# Patient Record
Sex: MALE | Race: OTHER
[De-identification: names, ages, dates, MRNs, and addresses within clinical notes are randomized per-mention and may not be internally consistent; named-entity substitution may affect disease eponyms.]

---

## 2018-12-26 ENCOUNTER — HOSPITAL ENCOUNTER (INPATIENT)
Dept: HOSPITAL 72 - EMR | Age: 43
LOS: 9 days | Discharge: HOME | DRG: 710 | End: 2019-01-04
Payer: MEDICAID

## 2018-12-26 VITALS — SYSTOLIC BLOOD PRESSURE: 162 MMHG | DIASTOLIC BLOOD PRESSURE: 85 MMHG

## 2018-12-26 VITALS — DIASTOLIC BLOOD PRESSURE: 67 MMHG | SYSTOLIC BLOOD PRESSURE: 153 MMHG

## 2018-12-26 VITALS — DIASTOLIC BLOOD PRESSURE: 69 MMHG | SYSTOLIC BLOOD PRESSURE: 169 MMHG

## 2018-12-26 VITALS — SYSTOLIC BLOOD PRESSURE: 158 MMHG | DIASTOLIC BLOOD PRESSURE: 83 MMHG

## 2018-12-26 VITALS — SYSTOLIC BLOOD PRESSURE: 150 MMHG | DIASTOLIC BLOOD PRESSURE: 69 MMHG

## 2018-12-26 VITALS — SYSTOLIC BLOOD PRESSURE: 155 MMHG | DIASTOLIC BLOOD PRESSURE: 79 MMHG

## 2018-12-26 VITALS — DIASTOLIC BLOOD PRESSURE: 79 MMHG | SYSTOLIC BLOOD PRESSURE: 161 MMHG

## 2018-12-26 VITALS — DIASTOLIC BLOOD PRESSURE: 75 MMHG | SYSTOLIC BLOOD PRESSURE: 152 MMHG

## 2018-12-26 VITALS — DIASTOLIC BLOOD PRESSURE: 77 MMHG | SYSTOLIC BLOOD PRESSURE: 158 MMHG

## 2018-12-26 VITALS — DIASTOLIC BLOOD PRESSURE: 77 MMHG | SYSTOLIC BLOOD PRESSURE: 148 MMHG

## 2018-12-26 VITALS — DIASTOLIC BLOOD PRESSURE: 89 MMHG | SYSTOLIC BLOOD PRESSURE: 152 MMHG

## 2018-12-26 VITALS — SYSTOLIC BLOOD PRESSURE: 163 MMHG | DIASTOLIC BLOOD PRESSURE: 105 MMHG

## 2018-12-26 VITALS — HEIGHT: 63 IN | BODY MASS INDEX: 30.3 KG/M2 | WEIGHT: 171 LBS

## 2018-12-26 DIAGNOSIS — R65.20: ICD-10-CM

## 2018-12-26 DIAGNOSIS — D64.9: ICD-10-CM

## 2018-12-26 DIAGNOSIS — E87.6: ICD-10-CM

## 2018-12-26 DIAGNOSIS — I96: ICD-10-CM

## 2018-12-26 DIAGNOSIS — I10: ICD-10-CM

## 2018-12-26 DIAGNOSIS — E87.1: ICD-10-CM

## 2018-12-26 DIAGNOSIS — M60.861: ICD-10-CM

## 2018-12-26 DIAGNOSIS — A41.02: Primary | ICD-10-CM

## 2018-12-26 DIAGNOSIS — E11.65: ICD-10-CM

## 2018-12-26 DIAGNOSIS — L03.115: ICD-10-CM

## 2018-12-26 LAB
ADD MANUAL DIFF: YES
ALBUMIN SERPL-MCNC: 2.5 G/DL (ref 3.4–5)
ALP SERPL-CCNC: 172 U/L (ref 46–116)
ALT SERPL-CCNC: 27 U/L (ref 12–78)
ANION GAP SERPL CALC-SCNC: 19 MMOL/L (ref 5–15)
APPEARANCE UR: CLEAR
APTT BLD: 46 SEC (ref 23–33)
APTT PPP: (no result) S
AST SERPL-CCNC: 15 U/L (ref 15–37)
BILIRUB SERPL-MCNC: 0.7 MG/DL (ref 0.2–1)
BUN SERPL-MCNC: 19 MG/DL (ref 7–18)
CALCIUM SERPL-MCNC: 9.3 MG/DL (ref 8.5–10.1)
CHLORIDE SERPL-SCNC: 92 MMOL/L (ref 98–107)
CK MB SERPL-MCNC: < 0.5 NG/ML (ref 0–3.6)
CK SERPL-CCNC: 46 U/L (ref 26–308)
CO2 SERPL-SCNC: 19 MMOL/L (ref 21–32)
CREAT SERPL-MCNC: 0.9 MG/DL (ref 0.55–1.3)
ERYTHROCYTE [DISTWIDTH] IN BLOOD BY AUTOMATED COUNT: 11.5 % (ref 11.6–14.8)
GLOBULIN SER-MCNC: 5.3 G/DL
GLUCOSE UR STRIP-MCNC: (no result) MG/DL
HCT VFR BLD CALC: 44.7 % (ref 42–52)
HGB BLD-MCNC: 14.8 G/DL (ref 14.2–18)
INR PPP: 1.1 (ref 0.9–1.1)
KETONES UR QL STRIP: (no result)
LEUKOCYTE ESTERASE UR QL STRIP: (no result)
MCV RBC AUTO: 92 FL (ref 80–99)
NITRITE UR QL STRIP: NEGATIVE
PH UR STRIP: 5 [PH] (ref 4.5–8)
PLATELET # BLD: 206 K/UL (ref 150–450)
POTASSIUM SERPL-SCNC: 4.2 MMOL/L (ref 3.5–5.1)
PROT UR QL STRIP: (no result)
RBC # BLD AUTO: 4.83 M/UL (ref 4.7–6.1)
SODIUM SERPL-SCNC: 130 MMOL/L (ref 136–145)
SP GR UR STRIP: 1.02 (ref 1–1.03)
UROBILINOGEN UR-MCNC: NORMAL MG/DL (ref 0–1)
WBC # BLD AUTO: 25.5 K/UL (ref 4.8–10.8)

## 2018-12-26 PROCEDURE — 0KBS0ZX EXCISION OF RIGHT LOWER LEG MUSCLE, OPEN APPROACH, DIAGNOSTIC: ICD-10-PCS

## 2018-12-26 PROCEDURE — 83036 HEMOGLOBIN GLYCOSYLATED A1C: CPT

## 2018-12-26 PROCEDURE — 85610 PROTHROMBIN TIME: CPT

## 2018-12-26 PROCEDURE — 87070 CULTURE OTHR SPECIMN AEROBIC: CPT

## 2018-12-26 PROCEDURE — 80202 ASSAY OF VANCOMYCIN: CPT

## 2018-12-26 PROCEDURE — 94150 VITAL CAPACITY TEST: CPT

## 2018-12-26 PROCEDURE — 80048 BASIC METABOLIC PNL TOTAL CA: CPT

## 2018-12-26 PROCEDURE — 82550 ASSAY OF CK (CPK): CPT

## 2018-12-26 PROCEDURE — 83615 LACTATE (LD) (LDH) ENZYME: CPT

## 2018-12-26 PROCEDURE — 93005 ELECTROCARDIOGRAM TRACING: CPT

## 2018-12-26 PROCEDURE — 86850 RBC ANTIBODY SCREEN: CPT

## 2018-12-26 PROCEDURE — 83550 IRON BINDING TEST: CPT

## 2018-12-26 PROCEDURE — 0K9S0ZZ DRAINAGE OF RIGHT LOWER LEG MUSCLE, OPEN APPROACH: ICD-10-PCS

## 2018-12-26 PROCEDURE — 80053 COMPREHEN METABOLIC PANEL: CPT

## 2018-12-26 PROCEDURE — 85007 BL SMEAR W/DIFF WBC COUNT: CPT

## 2018-12-26 PROCEDURE — 87040 BLOOD CULTURE FOR BACTERIA: CPT

## 2018-12-26 PROCEDURE — 36415 COLL VENOUS BLD VENIPUNCTURE: CPT

## 2018-12-26 PROCEDURE — 87181 SC STD AGAR DILUTION PER AGT: CPT

## 2018-12-26 PROCEDURE — 87205 SMEAR GRAM STAIN: CPT

## 2018-12-26 PROCEDURE — 87075 CULTR BACTERIA EXCEPT BLOOD: CPT

## 2018-12-26 PROCEDURE — 85730 THROMBOPLASTIN TIME PARTIAL: CPT

## 2018-12-26 PROCEDURE — 85060 BLOOD SMEAR INTERPRETATION: CPT

## 2018-12-26 PROCEDURE — 82270 OCCULT BLOOD FECES: CPT

## 2018-12-26 PROCEDURE — 82728 ASSAY OF FERRITIN: CPT

## 2018-12-26 PROCEDURE — 81003 URINALYSIS AUTO W/O SCOPE: CPT

## 2018-12-26 PROCEDURE — 85044 MANUAL RETICULOCYTE COUNT: CPT

## 2018-12-26 PROCEDURE — 0KDS0ZZ EXTRACTION OF RIGHT LOWER LEG MUSCLE, OPEN APPROACH: ICD-10-PCS

## 2018-12-26 PROCEDURE — 71045 X-RAY EXAM CHEST 1 VIEW: CPT

## 2018-12-26 PROCEDURE — 86900 BLOOD TYPING SEROLOGIC ABO: CPT

## 2018-12-26 PROCEDURE — 82746 ASSAY OF FOLIC ACID SERUM: CPT

## 2018-12-26 PROCEDURE — 83605 ASSAY OF LACTIC ACID: CPT

## 2018-12-26 PROCEDURE — 85384 FIBRINOGEN ACTIVITY: CPT

## 2018-12-26 PROCEDURE — 82553 CREATINE MB FRACTION: CPT

## 2018-12-26 PROCEDURE — 99291 CRITICAL CARE FIRST HOUR: CPT

## 2018-12-26 PROCEDURE — 86901 BLOOD TYPING SEROLOGIC RH(D): CPT

## 2018-12-26 PROCEDURE — 96365 THER/PROPH/DIAG IV INF INIT: CPT

## 2018-12-26 PROCEDURE — 83540 ASSAY OF IRON: CPT

## 2018-12-26 PROCEDURE — 85660 RBC SICKLE CELL TEST: CPT

## 2018-12-26 PROCEDURE — 85025 COMPLETE CBC W/AUTO DIFF WBC: CPT

## 2018-12-26 PROCEDURE — 82962 GLUCOSE BLOOD TEST: CPT

## 2018-12-26 PROCEDURE — 84484 ASSAY OF TROPONIN QUANT: CPT

## 2018-12-26 PROCEDURE — 83735 ASSAY OF MAGNESIUM: CPT

## 2018-12-26 PROCEDURE — 94003 VENT MGMT INPAT SUBQ DAY: CPT

## 2018-12-26 RX ADMIN — INSULIN ASPART SCH UNITS: 100 INJECTION, SOLUTION INTRAVENOUS; SUBCUTANEOUS at 23:06

## 2018-12-26 RX ADMIN — HEPARIN SODIUM SCH UNITS: 5000 INJECTION INTRAVENOUS; SUBCUTANEOUS at 22:00

## 2018-12-26 RX ADMIN — DEXTROSE MONOHYDRATE SCH MLS/HR: 50 INJECTION, SOLUTION INTRAVENOUS at 20:00

## 2018-12-26 RX ADMIN — DOCUSATE SODIUM SCH MG: 100 CAPSULE, LIQUID FILLED ORAL at 22:55

## 2018-12-26 NOTE — NUR
NURSE NOTES:PLACED A TELEPHONE CALL TO DR JACKSON AND MADE AWARE AND NOTIFIED REGARDING PT 
WITH FEVER 102 ORALLY.M.D STATING TO GIVE [id number] PO.PT MEDICATED AS PER M.D 
ORDERS.WILL CONT TO MONITOR.

## 2018-12-26 NOTE — NUR
ED Nurse Note:



spoke with HARDIK Vance from 2E regarding giving report. Per HARDIK Vance, receiving RN 
is scheduled to leave, will call back for further info.

## 2018-12-26 NOTE — PRE-PROCEDURE NOTE/ATTESTATION
Pre-Procedure Note/Attestation


Complete Prior to Procedure


Planned Procedure:  right


Procedure Narrative:


incision and drainage with possible debridement, open wound, drain or wound VAC 

placement of right leg





Indications for Procedure


Pre-Operative Diagnosis:


right leg cellulitis with abscess possible necrotizing infection





Attestation


I attest that I discussed the nature of the procedure; its benefits; risks and 

complications; and alternatives (and the risks and benefits of such alternatives

), prior to the procedure, with the patient (or the patient's legal 

representative).





I attest that, if there was a reasonable possibility of needing a blood 

transfusion, the patient (or the patient's legal representative) was given the 

[place] Department [employer] Services standardized written summary, pursuant 

to the [place] Blood Safety Act ([place] Health and Safety Code # 1645, as 

amended).





I attest that I re-evaluated the patient just prior to the surgery and that 

there has been no change in the patient's H&P, except as documented below:











Tony Fox Dec 26, 2018 19:19

## 2018-12-26 NOTE — IMMEDIATE POST-OP EVALUATION
Immediate Post-Op Evalulation


Immediate Post-Op Evalulation


Procedure:  R Leg I and D


Date of Evaluation:  Dec 26, 2018


Blood Products:  0


Estimated Blood Loss:  50


Urinary Output:  0


Pain Score (1-10):  2


Nausea:  No


Vomiting:  No


Complications


0


Patient Status:  awake, reacts, patent, none


Hydration Status:  adequate


Drug:  3.375 Grams Zosyn IV


Given Within 1 Hr of Incision:  Yes


Time Given:  20:38











Bhupinder Carlin MD Dec 26, 2018 21:05

## 2018-12-26 NOTE — NUR
NURSE NOTES:

Pt returned to floor at 2215. Pt has wound dressing on right leg, Leg to be elevated on 
pillows. new orders to be carried out.

## 2018-12-26 NOTE — NUR
ED Nurse Note:



ambulated in to ER due to swelling on the right leg s/p injury on right leg a 
week ago at work. Swelling and redness noted on the right leg, warm to touch. 
Rectal temp fo 101.8F, notified Dr. Berumen.

## 2018-12-26 NOTE — DIAGNOSTIC IMAGING REPORT
Indication: Leg pain, erythema, swelling

 

Technique: CT right leg was performed utilizing automated exposure control with

intravenous contrast material. Axial sagittal and coronal images were generated.

 

CT dose: Total  mGycm; CTDI vol 0.2, 9.7, 5.5 mGy

 

Comparison: Concurrent radiographs of the right leg

 

Findings: There is a fluid collection within the soft tissues of the lateral right

upper leg that measures approximately 5.5 cm anterior-posterior by 2.8 cm transverse

by approximately 10 cm craniocaudal. Some foci of internal high attenuation are noted

(series 7 image #34) which may represent hemorrhagic products. This may represent

evolution of an intramuscular hematoma given history of remote trauma. Infection of

this collection not excludable. There is skin thickening with subcutaneous

edema/stranding involving the distal 5/upper leg concerning for cellulitis. There is

a small suprapatellar joint effusion. There is no evidence of acute fracture or

dislocation. Imaged vascular structures are unremarkable. Imaged portions of the

visceral pelvis unremarkable.

 

IMPRESSION:

Fluid collection in the soft tissues of the right lateral upper calf which may

represent evolution of a prior intramuscular hematoma, especially given history of

remote trauma. There is concern for superimposed infection/abscess, especially given

skin thickening and subcutaneous stranding suggestive of a cellulitis. Correlate

clinically.

 

 

 

 

The CT scanner at [facility] is accredited by the American College of

Radiology and the scans are performed using protocols designed to limit radiation

exposure to as low as reasonably achievable to attain images of sufficient resolution

adequate for diagnostic evaluation.

## 2018-12-26 NOTE — DIAGNOSTIC IMAGING REPORT
Indication: Pain

 

Technique: XRAY Leg Lower Tib Fib 2v R

 

Comparison: None

 

Findings: No definite/displaced acute fracture identified. Anatomic alignment and

joint spaces appear preserved. There is mild soft tissue swelling. No radiopaque

foreign body.

 

Impression: Mild soft tissue swelling without radiographicly appreciable acute

fracture or dislocation.

## 2018-12-26 NOTE — HISTORY AND PHYSICAL
History of Present Illness


General


Date patient seen:  Dec 26, 2018


Time patient seen:  18:40


Reason for Hospitalization:  Lower Extremity Injury





Present Illness


HPI


43 year old male with h/o DM presents with complaints of worsening right calf 

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 Patient did not do much about it, 

continued to work however mentioned worsening pain over the day. States he had 

a cut from the fall and that it was increasingly painful and difficult to walk. 

Since then he has been having worsening swelling and pain which then led to 

subjective fevers and chills. Denies any n/v/cp/sob. CT done in the ED reviewed 

and showing concerns for infected hematoma, Surgery consulted for evaluation. 





social hx reviewed: denies smoking, alcohol use or drug use


fam hx reviewed, denies sig past fam hx


Allergies:  


Coded Allergies:  


     No Known Allergies (Unverified , 12/26/18)





Medication History


No Active Prescriptions or Reported Meds





Patient History


History Provided By:  Patient


Healthcare decision maker


N


Resuscitation status





Advanced Directive on File








Review of Systems


All Other Systems:  negative except mentioned in HPI


ROS Narrative


14 point ROS reviewed and negative except per above HPI





Physical Exam


General Appearance:  alert, mild distress


Lines, tubes and drains:  peripheral


HEENT:  normocephalic, atraumatic


Neck:  non-tender, normal alignment, supple, normal inspection


Respiratory/Chest:  chest wall non-tender, lungs clear, normal breath sounds, 

no respiratory distress, no accessory muscle use


Cardiovascular/Chest:  regular rhythm, no gallop/murmur, tachycardia


Abdomen:  normal bowel sounds, non tender, soft, no organomegaly, no mass


Extremities:  other - right calf TTP+, painful to active motion


Skin Exam:  other - right calf with cellulitis/erythema, blistering.


Neurologic:  CNs II-XII grossly normal, no motor/sensory deficits, alert, 

oriented x 3, responsive, normal mood/affect





Last 24 Hour Vital Signs








  Date Time  Temp Pulse Resp B/P (MAP) Pulse Ox O2 Delivery O2 Flow Rate FiO2


 


12/26/18 18:00 102.0 116 20 162/85 (110) 98   


 


12/26/18 17:30 101.0 114 19 161/79 99 Room Air  


 


12/26/18 16:44 101.0 114 19 161/79 99 Room Air  


 


12/26/18 16:10  117 26 158/77 99 Room Air  


 


12/26/18 15:06 101.0       


 


12/26/18 13:59 101.8       


 


12/26/18 12:50 101.6 121 26 163/105 100 Room Air  


 


12/26/18 12:44 100.4 123 20 159/94 97 Room Air  











Laboratory Tests








Test


  12/26/18


13:20


 


White Blood Count


  25.5 K/UL


(4.8-10.8)  *H


 


Red Blood Count


  4.83 M/UL


(4.70-6.10)


 


Hemoglobin


  14.8 G/DL


(14.2-18.0)


 


Hematocrit


  44.7 %


(42.0-52.0)


 


Mean Corpuscular Volume 92 FL (80-99)  


 


Mean Corpuscular Hemoglobin


  30.6 PG


(27.0-31.0)


 


Mean Corpuscular Hemoglobin


Concent 33.2 G/DL


(32.0-36.0)


 


Red Cell Distribution Width


  11.5 %


(11.6-14.8)  L


 


Platelet Count


  206 K/UL


(150-450)


 


Mean Platelet Volume


  7.8 FL


(6.5-10.1)


 


Neutrophils (%) (Auto)


  % (45.0-75.0)


 


 


Lymphocytes (%) (Auto)


  % (20.0-45.0)


 


 


Monocytes (%) (Auto)  % (1.0-10.0)  


 


Eosinophils (%) (Auto)  % (0.0-3.0)  


 


Basophils (%) (Auto)  % (0.0-2.0)  


 


Differential Total Cells


Counted 100  


 


 


Neutrophils % (Manual) 86 % (45-75)  H


 


Lymphocytes % (Manual) 1 % (20-45)  L


 


Monocytes % (Manual) 4 % (1-10)  


 


Eosinophils % (Manual) 0 % (0-3)  


 


Basophils % (Manual) 0 % (0-2)  


 


Band Neutrophils 9 % (0-8)  H


 


Platelet Estimate Adequate  


 


Platelet Morphology Normal  


 


Prothrombin Time


  11.6 SEC


(9.30-11.50)  H


 


Prothromb Time International


Ratio 1.1 (0.9-1.1)  


 


 


Activated Partial


Thromboplast Time 46 SEC (23-33)


H


 


Urine Color Pale yellow  


 


Urine Appearance Clear  


 


Urine pH 5 (4.5-8.0)  


 


Urine Specific Gravity


  1.020


(1.005-1.035)


 


Urine Protein


  2+ (NEGATIVE)


H


 


Urine Glucose (UA)


  4+ (NEGATIVE)


H


 


Urine Ketones


  4+ (NEGATIVE)


H


 


Urine Blood


  3+ (NEGATIVE)


H


 


Urine Nitrite


  Negative


(NEGATIVE)


 


Urine Bilirubin


  Negative


(NEGATIVE)


 


Urine Urobilinogen


  Normal MG/DL


(0.0-1.0)


 


Urine Leukocyte Esterase


  1+ (NEGATIVE)


H


 


Urine RBC


  5-10 /HPF (0 -


0)  H


 


Urine WBC


  0-2 /HPF (0 -


0)


 


Urine Squamous Epithelial


Cells Occasional


/LPF


 


Urine Bacteria


  Few /HPF


(NONE)


 


Sodium Level


  130 MMOL/L


(136-145)  L


 


Potassium Level


  4.2 MMOL/L


(3.5-5.1)


 


Chloride Level


  92 MMOL/L


()  L


 


Carbon Dioxide Level


  19 MMOL/L


(21-32)  L


 


Anion Gap


  19 mmol/L


(5-15)  H


 


Blood Urea Nitrogen


  19 mg/dL


(7-18)  H


 


Creatinine


  0.9 MG/DL


(0.55-1.30)


 


Estimat Glomerular Filtration


Rate > 60 mL/min


(>60)


 


Glucose Level


  393 MG/DL


()  H


 


Lactic Acid Level


  2.10 mmol/L


(0.4-2.0)  H


 


Calcium Level


  9.3 MG/DL


(8.5-10.1)


 


Total Bilirubin


  0.7 MG/DL


(0.2-1.0)


 


Aspartate Amino Transf


(AST/SGOT) 15 U/L (15-37)


 


 


Alanine Aminotransferase


(ALT/SGPT) 27 U/L (12-78)


 


 


Alkaline Phosphatase


  172 U/L


()  H


 


Total Creatine Kinase


  46 U/L


()


 


Creatine Kinase MB


  < 0.5 NG/ML


(0.0-3.6)


 


Creatine Kinase MB Relative


Index 1.0  


 


 


Troponin I


  0.000 ng/mL


(0.000-0.056)


 


Total Protein


  7.8 G/DL


(6.4-8.2)


 


Albumin


  2.5 G/DL


(3.4-5.0)  L


 


Globulin 5.3 g/dL  








Height (Feet):  5


Height (Inches):  4.00


Weight (Pounds):  140


Medications





Current Medications








 Medications


  (Trade)  Dose


 Ordered  Sig/Shae


 Route


 PRN Reason  Start Time


 Stop Time Status Last Admin


Dose Admin


 


 Acetaminophen


  (Tylenol)  650 mg  Q4H  PRN


 ORAL


 Mild Pain (Pain Scale 1-3)  12/26/18 18:45


 1/25/19 18:44  12/26/18 19:12


 


 


 Al Hydroxide/Mg


 Hydroxide


  (Mylanta II)  30 ml  Q6H  PRN


 ORAL


 dyspepsia  12/26/18 18:45


 1/25/19 18:44   


 


 


 Dextrose


  (Dextrose 50%)  25 ml  Q30M  PRN


 IV


 Hypoglycemia  12/26/18 18:45


 1/25/19 18:44   


 


 


 Dextrose


  (Dextrose 50%)  50 ml  Q30M  PRN


 IV


 Hypoglycemia  12/26/18 18:45


 1/25/19 18:44   


 


 


 Diphenhydramine


 HCl


  (Benadryl)  25 mg  Q6H  PRN


 ORAL


 Itching/Pruritis  12/26/18 18:45


 1/25/19 18:44   


 


 


 Docusate Sodium


  (Colace)  100 mg  EVERY 12  HOURS


 ORAL


   12/26/18 21:00


 1/25/19 20:59   


 


 


 Famotidine


  (Pepcid)  40 mg  DAILY


 ORAL


   12/27/18 09:00


 1/26/19 08:59   


 


 


 Heparin Sodium


  (Porcine)


  (Heparin 5000


 units/ml)  5,000 units  EVERY 8  HOURS


 SUBQ


   12/26/18 22:00


 1/25/19 21:59   


 


 


 Hydromorphone HCl


  (Dilaudid)  0.5 mg  Q3H  PRN


 IVP


 For Pain  12/26/18 18:45


 1/2/19 18:44   


 


 


 Hydromorphone HCl


  (Dilaudid)  1 mg  Q3H  PRN


 IVP


 Moderate Pain (Pain Scale 4-6)  12/26/18 18:45


 1/2/19 18:44   


 


 


 Hydromorphone HCl


  (Dilaudid)  2 mg  Q3H  PRN


 IVP


 Severe Pain (Pain Scale 7-10)  12/26/18 18:45


 1/2/19 18:44   


 


 


 Iopamidol


  (Isovue-370


 150ml)  150 ml  NOW  PRN


 INJ


 Radiology Procedure  12/26/18 14:30


 12/28/18 14:19   


 


 


 Lorazepam


  (Ativan 2mg/ml


 1ml)  0.5 mg  Q4H  PRN


 IV


 For Anxiety  12/26/18 18:45


 1/2/19 18:44   


 


 


 Magnesium


 Hydroxide


  (Mom)  30 ml  HSPRN  PRN


 ORAL


 Constipation  12/26/18 18:45


 1/25/19 18:44   


 


 


 Ondansetron HCl


  (Zofran)  4 mg  Q6H  PRN


 IVP


 Nausea & Vomiting  12/26/18 18:45


 1/25/19 18:44   


 


 


 Piperacillin Sod/


 Tazobactam Sod


 3.375 gm/Sodium


 Chloride  110 ml @ 


 27.5 mls/hr  Q8H


 IVPB


   12/26/18 20:00


 1/2/19 19:59   


 


 


 Sodium Chloride  1,000 ml @ 


 125 mls/hr  Q8H


 IVLG


   12/26/18 19:39


 1/25/19 19:38   


 


 


 Temazepam


  (Restoril)  15 mg  HSPRN  PRN


 ORAL


 Insomnia  12/26/18 18:45


 1/2/19 18:44   


 











Assessment/Plan


Problem List:  


(1) Severe sepsis


Assessment & Plan:  wcb 25, tachy with  and temp of 102


with right left leg abscess


gen surg consulted


vanco , zosyn


bcx x 2


IVF


pain control IV morphine


likely planned for surgery today 


CT done in the ED reviewed and showing concerns for infected hematoma


ICD Codes:  A41.9 - Sepsis, unspecified organism; R65.20 - Severe sepsis 

without septic shock


SNOMED:  69080207


(2) Abscess of leg, right


Assessment & Plan:  cont abx per above


cx sent


gen surg consulted, likely OR today


CT done in the ED reviewed and showing concerns for infected hematoma


ICD Codes:  L02.415 - Cutaneous abscess of right lower limb; R65.20 - Severe 

sepsis without septic shock


SNOMED:  315060554


(3) Cellulitis


Assessment & Plan:  cont abx


monitor closely


ICD Codes:  L03.90 - Cellulitis, unspecified


SNOMED:  442718788


Qualifiers:  


   


(4) Lactic acid acidosis


Assessment & Plan:  due to severe sepsis


LA 2.1


recheck LA 


IVF


ICD Codes:  E87.2 - Acidosis


SNOMED:  22406159


(5) Uncontrolled diabetes mellitus


Assessment & Plan:  iss


accuchecks


hgba1c


ICD Codes:  E11.65 - Type 2 diabetes mellitus with hyperglycemia


SNOMED:  90141387, 714116560


Qualifiers:  


   Qualified Codes:  E11.65 - Type 2 diabetes mellitus with hyperglycemia


(6) Essential hypertension


Assessment & Plan:  hydralazine prn


start lisinopril 20mg qday


monitor closely


likely exacerbated due to pain


ICD Codes:  I10 - Essential (primary) hypertension


SNOMED:  14837026


(7) Uncontrolled hypertension


Assessment & Plan:  hydralazine prn


started acei


monitor closely


ICD Codes:  I10 - Essential (primary) hypertension


SNOMED:  63872855, 52090088


(8) Hyponatremia


Assessment & Plan:  Na 130


fluids


should improved


likely due to dehydration


ICD Codes:  E87.1 - Hypo-osmolality and hyponatremia


SNOMED:  54657622


Status:  fever


Assessment/Plan


DVT Prophylaxis:   SCD, HSQ


Code Status:            Full


I have personally reviewed all labs, medications and imaging





Hospital Classification Declaration: Based on this initial evaluation, and 

depending on the patient's clinical course, I anticipate that this patient will 

require hospitalization for [2-3 midnights for severe sepsis/right leg abscess 

likely requiring surgery and close respiratory/hemodynamic monitoring.





Disposition: Once the patient is stable to leave the hospital, I anticipate the 

patient will likely be discharged to the following environment: home with HH vs 

SNF





I spent 72 minutes on this patient's case, and 43 minutes were dedicated to 

counseling and/or care coordination. Discussed with patient/family, nursing 

staff, SW/CM, and consultants regarding clinical status, treatment course, and 

disposition planning.





Time of note may not reflect time of encounter.











Celi Kilpatrick MD Dec 26, 2018 20:12

## 2018-12-26 NOTE — NUR
NURSE NOTES: PLACED A TELEPHONE CALL TO DR SCHMIDT ,REGARDING ADMITIONS ORDERS AND PT WITH 
FEVER 102 ORAL.MESSAGE LEFT ON EMERGENCY VOICE MAIL.A WAITING FOR M.D TO CALL ME BACK.WILL 
CONT TO MONITOR.

## 2018-12-26 NOTE — NUR
NURSE NOTES: received report at bed side from yohana Conley RN ED.Received a 43 yrs old male 
German speaking only very little english .PT with Dx of sepsis and rt lower leg 
cellulitis.pt with  hx of dm.Placed a telephone call to DR CHRIS sinclair for admiting orders.Will 
cont to monitor.

## 2018-12-26 NOTE — NUR
ED Nurse Note:



attempted to give report. Per CN in 2E, she will ask the nurse to call back for 
report.

## 2018-12-26 NOTE — NUR
TRANSFER TO FLOOR:

Patient transferred to [facility] as ordered.   Report given to HARDIK Vasquez.  Belongings 
given to HARDIK Vasquez. Notified by RACHAEL Garnica that pt's admitting MD will be Dr. Ballesteros and she will place the order for admit in patient in Tele. Endorsed to 
HARDIK Vasquez. Skin intact, swelling and warm to touch on right leg remain. Oral temp 
of 101. F

## 2018-12-26 NOTE — NUR
NURSE NOTES:

NEW ADMISSION. Recvd very brief report from receiving RN. Admitting Dr. Ballesteros, dx; 
Sepsis, right leg cellulitis. Per report, Dr Fox was planning on performing surgery 
later this evening. Surgery transport showed up 15 later to transport patient. I obtained 
consent from the patient. Significant other and family at bedside. IV fluids were connected 
NS @ 125cc/hr. IV site is right AC 18g, with no sign of infiltration or redness. Pt 
initially had fever 102.0, pt was given tylenol and cold compresses. Pt new temp 100.2. 
Ruddy aware and states ok for surgery. BS was 301, Dr Fox notified, ok for 
surgery, no insulin was given. Zosyn was scanned and hung. Pt was  taken to surgery at about 
2030. Admission profile was completed. NO skin issues other than the area of concern on 
right leg. Pt is AOx4, [place] Speaking. Pt was NPO since this morning. Significant other 
Anahy would like to be notified when pt returns to floor: Anayh [phone]

## 2018-12-26 NOTE — CONSULTATION
History of Present Illness


General


Date patient seen:  Dec 26, 2018


Chief Complaint:  Lower Extremity Injury





Present Illness


HPI


43 year old male with history of DM presents to ED with complaints of worsening 

right calf pain.  States last week had stretch injury at work when he felt he 

overstretched his left calf. Felt initial pain but continued to work.  since 

has been worsening with acute worsening over last day.  now has edema and skin 

blistering.  states difficult and painful to walk.  having fevers.  no n/v.  in 

ED noted to have fevers, tachycardia, and leukocytosis. CT with likely infected 

hematoma. surgery called to evaluate.


Allergies:  


Coded Allergies:  


     No Known Allergies (Unverified , 12/26/18)





Medication History


No Active Prescriptions or Reported Meds





Patient History


History Provided By:  Patient, Family Member, Medical Record, PMD


Healthcare decision maker


N


Resuscitation status





Advanced Directive on File








Past Medical/Surgical History


Past Medical/Surgical History:  


(1) Cellulitis


(2) Hyperglycemia


(3) Metabolic acidosis


(4) Severe sepsis


(5) Abscess of leg, right





Review of Systems


All Other Systems:  negative except mentioned in HPI





Physical Exam


General Appearance:  alert, mild distress


Lines, tubes and drains:  peripheral


HEENT:  normocephalic, mucous membranes moist


Neck:  normal inspection


Respiratory/Chest:  normal breath sounds, no respiratory distress, no accessory 

muscle use


Cardiovascular/Chest:  tachycardia


Abdomen:  non tender, soft, no organomegaly, no mass


Extremities:  other - right calf with tenderness, skin blistering, cellulitis, 

no trauma. tender with active and passive motion


Skin Exam:  warm/dry


Neurologic:  alert, oriented x 3





Last 24 Hour Vital Signs








  Date Time  Temp Pulse Resp B/P (MAP) Pulse Ox O2 Delivery O2 Flow Rate FiO2


 


12/26/18 17:30 101.0 114 19 161/79 99 Room Air  


 


12/26/18 16:44 101.0 114 19 161/79 99 Room Air  


 


12/26/18 16:10  117 26 158/77 99 Room Air  


 


12/26/18 15:06 101.0       


 


12/26/18 13:59 101.8       


 


12/26/18 12:50 101.6 121 26 163/105 100 Room Air  


 


12/26/18 12:44 100.4 123 20 159/94 97 Room Air  











Laboratory Tests








Test


  12/26/18


13:20


 


White Blood Count


  25.5 K/UL


(4.8-10.8)  *H


 


Red Blood Count


  4.83 M/UL


(4.70-6.10)


 


Hemoglobin


  14.8 G/DL


(14.2-18.0)


 


Hematocrit


  44.7 %


(42.0-52.0)


 


Mean Corpuscular Volume 92 FL (80-99)  


 


Mean Corpuscular Hemoglobin


  30.6 PG


(27.0-31.0)


 


Mean Corpuscular Hemoglobin


Concent 33.2 G/DL


(32.0-36.0)


 


Red Cell Distribution Width


  11.5 %


(11.6-14.8)  L


 


Platelet Count


  206 K/UL


(150-450)


 


Mean Platelet Volume


  7.8 FL


(6.5-10.1)


 


Neutrophils (%) (Auto)


  % (45.0-75.0)


 


 


Lymphocytes (%) (Auto)


  % (20.0-45.0)


 


 


Monocytes (%) (Auto)  % (1.0-10.0)  


 


Eosinophils (%) (Auto)  % (0.0-3.0)  


 


Basophils (%) (Auto)  % (0.0-2.0)  


 


Differential Total Cells


Counted 100  


 


 


Neutrophils % (Manual) 86 % (45-75)  H


 


Lymphocytes % (Manual) 1 % (20-45)  L


 


Monocytes % (Manual) 4 % (1-10)  


 


Eosinophils % (Manual) 0 % (0-3)  


 


Basophils % (Manual) 0 % (0-2)  


 


Band Neutrophils 9 % (0-8)  H


 


Platelet Estimate Adequate  


 


Platelet Morphology Normal  


 


Prothrombin Time


  11.6 SEC


(9.30-11.50)  H


 


Prothromb Time International


Ratio 1.1 (0.9-1.1)  


 


 


Activated Partial


Thromboplast Time 46 SEC (23-33)


H


 


Urine Color Pale yellow  


 


Urine Appearance Clear  


 


Urine pH 5 (4.5-8.0)  


 


Urine Specific Gravity


  1.020


(1.005-1.035)


 


Urine Protein


  2+ (NEGATIVE)


H


 


Urine Glucose (UA)


  4+ (NEGATIVE)


H


 


Urine Ketones


  4+ (NEGATIVE)


H


 


Urine Blood


  3+ (NEGATIVE)


H


 


Urine Nitrite


  Negative


(NEGATIVE)


 


Urine Bilirubin


  Negative


(NEGATIVE)


 


Urine Urobilinogen


  Normal MG/DL


(0.0-1.0)


 


Urine Leukocyte Esterase


  1+ (NEGATIVE)


H


 


Urine RBC


  5-10 /HPF (0 -


0)  H


 


Urine WBC


  0-2 /HPF (0 -


0)


 


Urine Squamous Epithelial


Cells Occasional


/LPF


 


Urine Bacteria


  Few /HPF


(NONE)


 


Sodium Level


  130 MMOL/L


(136-145)  L


 


Potassium Level


  4.2 MMOL/L


(3.5-5.1)


 


Chloride Level


  92 MMOL/L


()  L


 


Carbon Dioxide Level


  19 MMOL/L


(21-32)  L


 


Anion Gap


  19 mmol/L


(5-15)  H


 


Blood Urea Nitrogen


  19 mg/dL


(7-18)  H


 


Creatinine


  0.9 MG/DL


(0.55-1.30)


 


Estimat Glomerular Filtration


Rate > 60 mL/min


(>60)


 


Glucose Level


  393 MG/DL


()  H


 


Lactic Acid Level


  2.10 mmol/L


(0.4-2.0)  H


 


Calcium Level


  9.3 MG/DL


(8.5-10.1)


 


Total Bilirubin


  0.7 MG/DL


(0.2-1.0)


 


Aspartate Amino Transf


(AST/SGOT) 15 U/L (15-37)


 


 


Alanine Aminotransferase


(ALT/SGPT) 27 U/L (12-78)


 


 


Alkaline Phosphatase


  172 U/L


()  H


 


Total Creatine Kinase


  46 U/L


()


 


Creatine Kinase MB


  < 0.5 NG/ML


(0.0-3.6)


 


Creatine Kinase MB Relative


Index 1.0  


 


 


Troponin I


  0.000 ng/mL


(0.000-0.056)


 


Total Protein


  7.8 G/DL


(6.4-8.2)


 


Albumin


  2.5 G/DL


(3.4-5.0)  L


 


Globulin 5.3 g/dL  








Height (Feet):  5


Height (Inches):  4.00


Weight (Pounds):  140


Medications





Current Medications








 Medications


  (Trade)  Dose


 Ordered  Sig/Shae


 Route


 PRN Reason  Start Time


 Stop Time Status Last Admin


Dose Admin


 


 Iopamidol


  (Isovue-370


 150ml)  150 ml  NOW  PRN


 INJ


 Radiology Procedure  12/26/18 14:30


 12/28/18 14:19   


 











Assessment/Plan


Problem List:  


(1) Cellulitis


Assessment & Plan:  cellulitis of right leg


medical comorbidities


leukocytosis


fevers


tachycardia


septic. 


CT with possible infected hematoma


exam with tenderness / skin blistering


exam concerning 


warrants operative exploration


to OR for I&D and wound exploration 


npo


iv fluids


iv abx


consent


ICD Codes:  L03.90 - Cellulitis, unspecified


SNOMED:  846116386


Qualifiers:  


   


(2) Abscess of leg, right


ICD Codes:  L02.415 - Cutaneous abscess of right lower limb; R65.20 - Severe 

sepsis without septic shock


SNOMED:  [id number]


(3) Severe sepsis


ICD Codes:  A41.9 - Sepsis, unspecified organism; R65.20 - Severe sepsis 

without septic shock


SNOMED:  [id number]











Tony Fox Dec 26, 2018 18:39

## 2018-12-26 NOTE — ANETHESIA PREOPERATIVE EVAL
Anesthesia Pre-op PMH/ROS


General


Date of Evaluation:  Dec 26, 2018


Time of Evaluation:  20:21


Anesthesiologist:  Tesfaye


ASA Score:  ASA 3 - Emergency


Mallampati Score


Class I : Soft palate, uvula, fauces, pillars visible


Class II: Soft palate, uvula, fauces visible


Class III: Soft palate, base of uvula visible


Class IV: Only hard plate visible


Mallampati Classification:  Class II


Surgeon:  Ruddy


Diagnosis:  R Leg Abscess


Surgical Procedure:  R Leg I and D, Fasciolotmy


Anesthesia History:  none


Family History:  no anesthesia problems


Allergies:  


Coded Allergies:  


     No Known Allergies (Unverified , 12/26/18)


Medications:  see eMAR


Patient NPO?:  Yes





Past Medical History


Cardiovascular:  Reports: HTN


Endocrine:  Reports: DM


Hematology/Immune:  Reports: other - Sepsis





Anesthesia Pre-op Phys. Exam


Physician Exam





Last Vital Signs








  Date Time  Temp Pulse Resp B/P (MAP) Pulse Ox O2 Delivery O2 Flow Rate FiO2


 


12/26/18 20:34      Room Air  


 


12/26/18 19:42 100.2       


 


12/26/18 18:00  116 20 162/85 (110) 98   








Constitutional:  NAD


Neurologic:  CN 2-12 intact


Cardiovascular:  RRR


Respiratory:  CTA


Gastrointestinal:  S/NT/ND





Airway Exam


Mallampati Score:  Class II


MO:  limited


ROM:  limited


Teeth:  missing, intact, broken





Anesthesia Pre-op A/P


Labs





Hematology








Test


  12/26/18


13:20


 


White Blood Count


  25.5 K/UL


(4.8-10.8)  *H


 


Red Blood Count


  4.83 M/UL


(4.70-6.10)


 


Hemoglobin


  14.8 G/DL


(14.2-18.0)


 


Hematocrit


  44.7 %


(42.0-52.0)


 


Mean Corpuscular Volume 92 FL (80-99)  


 


Mean Corpuscular Hemoglobin


  30.6 PG


(27.0-31.0)


 


Mean Corpuscular Hemoglobin


Concent 33.2 G/DL


(32.0-36.0)


 


Red Cell Distribution Width


  11.5 %


(11.6-14.8)  L


 


Platelet Count


  206 K/UL


(150-450)


 


Mean Platelet Volume


  7.8 FL


(6.5-10.1)


 


Neutrophils (%) (Auto)


  % (45.0-75.0)


 


 


Lymphocytes (%) (Auto)


  % (20.0-45.0)


 


 


Monocytes (%) (Auto)  % (1.0-10.0)  


 


Eosinophils (%) (Auto)  % (0.0-3.0)  


 


Basophils (%) (Auto)  % (0.0-2.0)  


 


Differential Total Cells


Counted 100  


 


 


Neutrophils % (Manual) 86 % (45-75)  H


 


Lymphocytes % (Manual) 1 % (20-45)  L


 


Monocytes % (Manual) 4 % (1-10)  


 


Eosinophils % (Manual) 0 % (0-3)  


 


Basophils % (Manual) 0 % (0-2)  


 


Band Neutrophils 9 % (0-8)  H


 


Platelet Estimate Adequate  


 


Platelet Morphology Normal  








Coagulation








Test


  12/26/18


13:20


 


Prothrombin Time


  11.6 SEC


(9.30-11.50)  H


 


Prothromb Time International


Ratio 1.1 (0.9-1.1)  


 


 


Activated Partial


Thromboplast Time 46 SEC (23-33)


H








Chemistry








Test


  12/26/18


13:20


 


Sodium Level


  130 MMOL/L


(136-145)  L


 


Potassium Level


  4.2 MMOL/L


(3.5-5.1)


 


Chloride Level


  92 MMOL/L


()  L


 


Carbon Dioxide Level


  19 MMOL/L


(21-32)  L


 


Anion Gap


  19 mmol/L


(5-15)  H


 


Blood Urea Nitrogen


  19 mg/dL


(7-18)  H


 


Creatinine


  0.9 MG/DL


(0.55-1.30)


 


Estimat Glomerular Filtration


Rate > 60 mL/min


(>60)


 


Glucose Level


  393 MG/DL


()  H


 


Lactic Acid Level


  2.10 mmol/L


(0.4-2.0)  H


 


Calcium Level


  9.3 MG/DL


(8.5-10.1)


 


Total Bilirubin


  0.7 MG/DL


(0.2-1.0)


 


Aspartate Amino Transf


(AST/SGOT) 15 U/L (15-37)


 


 


Alanine Aminotransferase


(ALT/SGPT) 27 U/L (12-78)


 


 


Alkaline Phosphatase


  172 U/L


()  H


 


Total Creatine Kinase


  46 U/L


()


 


Creatine Kinase MB


  < 0.5 NG/ML


(0.0-3.6)


 


Creatine Kinase MB Relative


Index 1.0  


 


 


Troponin I


  0.000 ng/mL


(0.000-0.056)


 


Total Protein


  7.8 G/DL


(6.4-8.2)


 


Albumin


  2.5 G/DL


(3.4-5.0)  L


 


Globulin 5.3 g/dL  











Risk Assessment & Plan


Assessment:


ASA 3E


Plan:


GA, SED


Status Change Before Surgery:  No





Pre-Antibiotics


Drug:  3.375 Grams Zosyn IV


Given Within 1 Hr of Incision:  Yes


Time Given:  20:38











Bhupinder Carlin MD Dec 26, 2018 21:04

## 2018-12-26 NOTE — NUR
ED Nurse Note:



attempted to give report. Per HARDIK Vance, will need 5 more min to receive report.

## 2018-12-26 NOTE — EMERGENCY ROOM REPORT
History of Present Illness


General


Chief Complaint:  Lower Extremity Injury


Source:  Patient





Present Illness


HPI


Patient is a 43-year-old male presented after increased right lower extremity 

pain and discomfort.  Patient reportedly had been injured at work.  Patient 

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  Patient 

sustained a cut to his lower extremity.  This had become increasingly painful 

and uncomfortable.  Patient was noted to have worsening swelling over the past 

few days.  Patient did not seek any medical treatment.  He reportedly began 

having fever and chills.Patient denies any fever.


Allergies:  


Coded Allergies:  


     No Known Allergies (Unverified , 12/26/18)





Patient History


Past Medical History:  see triage record


Reviewed Nursing Documentation:  PMH: Agreed; PSxH: Agreed





Nursing Documentation-PMH


Past Medical History:  No Stated History





Review of Systems


All Other Systems:  negative except mentioned in HPI





Physical Exam





Vital Signs








  Date Time  Temp Pulse Resp B/P (MAP) Pulse Ox O2 Delivery O2 Flow Rate FiO2


 


12/26/18 12:44 100.4 123 20 159/94 97 Room Air  








Sp02 EP Interpretation:  reviewed, normal


General Appearance:  normal inspection, alert, GCS 15, Chronically Ill


Head:  atraumatic


ENT:  normal ENT inspection, hearing grossly normal, normal voice


Neck:  normal inspection, full range of motion, supple, no bony tend


Respiratory:  normal inspection, lungs clear, normal breath sounds, no 

respiratory distress, no retraction, no wheezing


Cardiovascular #1:  regular rate, rhythm, no edema


Gastrointestinal:  normal inspection, normal bowel sounds, non tender, soft, no 

guarding, no hernia


Genitourinary:  no CVA tenderness


Musculoskeletal:  normal inspection, back normal, normal range of motion


Neurologic:  normal inspection, alert, responsive, speech normal


Psychiatric:  normal inspection, judgement/insight normal, mood/affect normal


Skin:  other - induration, erythema, edema and fluctuance to right lower 

extremity largest area near lateral right calf





Procedures


Critical Care Time


Critical Care Time


Patient had a critical medical condition which untreated could potentially 

result in life or limb threatening injury.  Total  critical care time excluding 

procedures approximately 45 minutes.





Medical Decision Making


Diagnostic Impression:  


 Primary Impression:  


 Severe sepsis


 Additional Impressions:  


 Abscess of leg, right


 Cellulitis


 Metabolic acidosis


 Hyperglycemia


ER Course


Patient presented for extremity pain.  Differential diagnosis includes is not 

limited to abscess, cellulitis, necrotizing fasciitis, among others.  Patient's 

right lower extremity appears to be grossly infected.  There is marked edema.  

This appears to be more likely infection and deep venous thrombosis.  Plain 

film x-ray showed no evidence of fracture read by radiology there is minimal 

soft tissue swelling.  CT of the right lower extremity with IV contrast was 

ordered to define abscess.  Dr. Fox was contacted for surgical consult.  

Dr. Key was contacted for inpatient management.  Patient was started on IV 

fluids as well as IV antibiotics





Labs








Test


  12/26/18


13:20


 


White Blood Count


  25.5 K/UL


(4.8-10.8)


 


Red Blood Count


  4.83 M/UL


(4.70-6.10)


 


Hemoglobin


  14.8 G/DL


(14.2-18.0)


 


Hematocrit


  44.7 %


(42.0-52.0)


 


Mean Corpuscular Volume 92 FL (80-99) 


 


Mean Corpuscular Hemoglobin


  30.6 PG


(27.0-31.0)


 


Mean Corpuscular Hemoglobin


Concent 33.2 G/DL


(32.0-36.0)


 


Red Cell Distribution Width


  11.5 %


(11.6-14.8)


 


Platelet Count


  206 K/UL


(150-450)


 


Mean Platelet Volume


  7.8 FL


(6.5-10.1)


 


Neutrophils (%) (Auto)  % (45.0-75.0) 


 


Lymphocytes (%) (Auto)  % (20.0-45.0) 


 


Monocytes (%) (Auto)  % (1.0-10.0) 


 


Eosinophils (%) (Auto)  % (0.0-3.0) 


 


Basophils (%) (Auto)  % (0.0-2.0) 


 


Differential Total Cells


Counted 100 


 


 


Neutrophils % (Manual) 86 % (45-75) 


 


Lymphocytes % (Manual) 1 % (20-45) 


 


Monocytes % (Manual) 4 % (1-10) 


 


Eosinophils % (Manual) 0 % (0-3) 


 


Basophils % (Manual) 0 % (0-2) 


 


Band Neutrophils 9 % (0-8) 


 


Platelet Estimate Adequate 


 


Platelet Morphology Normal 


 


Prothrombin Time


  11.6 SEC


(9.30-11.50)


 


Prothromb Time International


Ratio 1.1 (0.9-1.1) 


 


 


Activated Partial


Thromboplast Time 46 SEC (23-33) 


 


 


Urine Color Pale yellow 


 


Urine Appearance Clear 


 


Urine pH 5 (4.5-8.0) 


 


Urine Specific Gravity


  1.020


(1.005-1.035)


 


Urine Protein 2+ (NEGATIVE) 


 


Urine Glucose (UA) 4+ (NEGATIVE) 


 


Urine Ketones 4+ (NEGATIVE) 


 


Urine Blood 3+ (NEGATIVE) 


 


Urine Nitrite


  Negative


(NEGATIVE)


 


Urine Bilirubin


  Negative


(NEGATIVE)


 


Urine Urobilinogen


  Normal MG/DL


(0.0-1.0)


 


Urine Leukocyte Esterase 1+ (NEGATIVE) 


 


Urine RBC


  5-10 /HPF (0 -


0)


 


Urine WBC


  0-2 /HPF (0 -


0)


 


Urine Squamous Epithelial


Cells Occasional


/LPF


 


Urine Bacteria


  Few /HPF


(NONE)


 


Sodium Level


  130 MMOL/L


(136-145)


 


Potassium Level


  4.2 MMOL/L


(3.5-5.1)


 


Chloride Level


  92 MMOL/L


()


 


Carbon Dioxide Level


  19 MMOL/L


(21-32)


 


Anion Gap


  19 mmol/L


(5-15)


 


Blood Urea Nitrogen


  19 mg/dL


(7-18)


 


Creatinine


  0.9 MG/DL


(0.55-1.30)


 


Estimat Glomerular Filtration


Rate > 60 mL/min


(>60)


 


Glucose Level


  393 MG/DL


()


 


Lactic Acid Level


  2.10 mmol/L


(0.4-2.0)


 


Calcium Level


  9.3 MG/DL


(8.5-10.1)


 


Total Bilirubin


  0.7 MG/DL


(0.2-1.0)


 


Aspartate Amino Transf


(AST/SGOT) 15 U/L (15-37) 


 


 


Alanine Aminotransferase


(ALT/SGPT) 27 U/L (12-78) 


 


 


Alkaline Phosphatase


  172 U/L


()


 


Total Creatine Kinase


  46 U/L


()


 


Creatine Kinase MB


  < 0.5 NG/ML


(0.0-3.6)


 


Creatine Kinase MB Relative


Index 1.0 


 


 


Troponin I


  0.000 ng/mL


(0.000-0.056)


 


Total Protein


  7.8 G/DL


(6.4-8.2)


 


Albumin


  2.5 G/DL


(3.4-5.0)


 


Globulin 5.3 g/dL 











Last Vital Signs








  Date Time  Temp Pulse Resp B/P (MAP) Pulse Ox O2 Delivery O2 Flow Rate FiO2


 


12/26/18 13:59 101.8       


 


12/26/18 12:50  121 26 163/105 100 Room Air  








Status:  unchanged


Disposition:  ADMITTED AS INPATIENT


Condition:  Serious


Scripts


No Active Prescriptions or Reported Meds


Referrals:  


NOT CHOSEN IPA/MD,REFERRING (PCP)











Sumanth Berumen MD Dec 26, 2018 15:19

## 2018-12-26 NOTE — IMMEDIATE POST-OP EVALUATION
Immediate Post-Op Evalulation


Immediate Post-Op Evalulation


Procedure:  R Leg I and D


Date of Evaluation:  Dec 26, 2018


Time of Evaluation:  21:29


IV Fluids:  700 LR


Blood Products:  0


Estimated Blood Loss:  20


Urinary Output:  0


Blood Pressure Systolic:  161


Blood Pressure Diastolic:  69


Pulse Rate:  133


Respiratory Rate:  16


O2 Sat by Pulse Oximetry:  95


Temperature (Fahrenheit):  99.3


Pain Score (1-10):  2


Nausea:  No


Vomiting:  No


Complications


0


Patient Status:  awake, reacts, none


Hydration Status:  adequate


Drug:  3.375 Grams Zosyn


Given Within 1 Hr of Incision:  Yes


Time Given:  20:38











Bhupinder Carlin MD Dec 26, 2018 21:20

## 2018-12-26 NOTE — BRIEF OPERATIVE NOTE
Immediate Post Operative Note


Operative Note


Pre-op Diagnosis:


right leg cellulitis with abscess possible necrotizing infection


Procedure:


1. incision and drainage of right leg absces


2. debridement of necrotic muscle with muscle biopsy


3. washout 


4. packing and dressings


Surgeon:  halima


Anesthesiologist:  arnaldo


Anesthesia:  general


Specimen:  yes


Complications:  none


Condition:  stable


Fluids:  see records


Estimated Blood Loss:  volume - 50


Drains:  none


Implant(s) used?:  No











Tony Fox Dec 26, 2018 21:38

## 2018-12-27 VITALS — SYSTOLIC BLOOD PRESSURE: 140 MMHG | DIASTOLIC BLOOD PRESSURE: 77 MMHG

## 2018-12-27 VITALS — SYSTOLIC BLOOD PRESSURE: 153 MMHG | DIASTOLIC BLOOD PRESSURE: 78 MMHG

## 2018-12-27 VITALS — DIASTOLIC BLOOD PRESSURE: 73 MMHG | SYSTOLIC BLOOD PRESSURE: 147 MMHG

## 2018-12-27 VITALS — SYSTOLIC BLOOD PRESSURE: 139 MMHG | DIASTOLIC BLOOD PRESSURE: 82 MMHG

## 2018-12-27 VITALS — SYSTOLIC BLOOD PRESSURE: 141 MMHG | DIASTOLIC BLOOD PRESSURE: 84 MMHG

## 2018-12-27 VITALS — DIASTOLIC BLOOD PRESSURE: 75 MMHG | SYSTOLIC BLOOD PRESSURE: 142 MMHG

## 2018-12-27 LAB
ADD MANUAL DIFF: YES
ALBUMIN SERPL-MCNC: 1.7 G/DL (ref 3.4–5)
ALBUMIN/GLOB SERPL: 0.3 {RATIO} (ref 1–2.7)
ALP SERPL-CCNC: 153 U/L (ref 46–116)
ALT SERPL-CCNC: 19 U/L (ref 12–78)
ANION GAP SERPL CALC-SCNC: 16 MMOL/L (ref 5–15)
APTT BLD: 46 SEC (ref 23–33)
AST SERPL-CCNC: 19 U/L (ref 15–37)
BILIRUB SERPL-MCNC: 0.6 MG/DL (ref 0.2–1)
BUN SERPL-MCNC: 24 MG/DL (ref 7–18)
CALCIUM SERPL-MCNC: 7.8 MG/DL (ref 8.5–10.1)
CHLORIDE SERPL-SCNC: 99 MMOL/L (ref 98–107)
CO2 SERPL-SCNC: 18 MMOL/L (ref 21–32)
CREAT SERPL-MCNC: 0.8 MG/DL (ref 0.55–1.3)
ERYTHROCYTE [DISTWIDTH] IN BLOOD BY AUTOMATED COUNT: 11.5 % (ref 11.6–14.8)
GLOBULIN SER-MCNC: 4.9 G/DL
HCT VFR BLD CALC: 35.5 % (ref 42–52)
HGB BLD-MCNC: 11.9 G/DL (ref 14.2–18)
INR PPP: 1.1 (ref 0.9–1.1)
MCV RBC AUTO: 91 FL (ref 80–99)
PLATELET # BLD: 189 K/UL (ref 150–450)
POTASSIUM SERPL-SCNC: 3.4 MMOL/L (ref 3.5–5.1)
RBC # BLD AUTO: 3.89 M/UL (ref 4.7–6.1)
SODIUM SERPL-SCNC: 133 MMOL/L (ref 136–145)
WBC # BLD AUTO: 26 K/UL (ref 4.8–10.8)

## 2018-12-27 RX ADMIN — DOCUSATE SODIUM SCH MG: 100 CAPSULE, LIQUID FILLED ORAL at 08:29

## 2018-12-27 RX ADMIN — SODIUM CHLORIDE SCH MLS/HR: 9 INJECTION, SOLUTION INTRAVENOUS at 23:32

## 2018-12-27 RX ADMIN — INSULIN ASPART SCH UNITS: 100 INJECTION, SOLUTION INTRAVENOUS; SUBCUTANEOUS at 11:52

## 2018-12-27 RX ADMIN — Medication SCH MLS/HR: at 06:10

## 2018-12-27 RX ADMIN — DEXTROSE MONOHYDRATE SCH MLS/HR: 50 INJECTION, SOLUTION INTRAVENOUS at 03:57

## 2018-12-27 RX ADMIN — Medication SCH MLS/HR: at 13:36

## 2018-12-27 RX ADMIN — HEPARIN SODIUM SCH UNITS: 5000 INJECTION INTRAVENOUS; SUBCUTANEOUS at 06:12

## 2018-12-27 RX ADMIN — DEXTROSE MONOHYDRATE SCH MLS/HR: 50 INJECTION, SOLUTION INTRAVENOUS at 20:27

## 2018-12-27 RX ADMIN — INSULIN ASPART SCH UNITS: 100 INJECTION, SOLUTION INTRAVENOUS; SUBCUTANEOUS at 21:21

## 2018-12-27 RX ADMIN — DIPHENHYDRAMINE HYDROCHLORIDE PRN MG: 50 INJECTION INTRAMUSCULAR; INTRAVENOUS at 12:00

## 2018-12-27 RX ADMIN — HEPARIN SODIUM SCH UNITS: 5000 INJECTION INTRAVENOUS; SUBCUTANEOUS at 21:20

## 2018-12-27 RX ADMIN — INSULIN ASPART SCH UNITS: 100 INJECTION, SOLUTION INTRAVENOUS; SUBCUTANEOUS at 16:50

## 2018-12-27 RX ADMIN — HEPARIN SODIUM SCH UNITS: 5000 INJECTION INTRAVENOUS; SUBCUTANEOUS at 14:41

## 2018-12-27 RX ADMIN — DOCUSATE SODIUM SCH MG: 100 CAPSULE, LIQUID FILLED ORAL at 21:18

## 2018-12-27 RX ADMIN — INSULIN ASPART SCH UNITS: 100 INJECTION, SOLUTION INTRAVENOUS; SUBCUTANEOUS at 06:13

## 2018-12-27 RX ADMIN — DEXTROSE MONOHYDRATE SCH MLS/HR: 50 INJECTION, SOLUTION INTRAVENOUS at 13:01

## 2018-12-27 RX ADMIN — DIPHENHYDRAMINE HYDROCHLORIDE PRN MG: 50 INJECTION INTRAMUSCULAR; INTRAVENOUS at 17:00

## 2018-12-27 RX ADMIN — LISINOPRIL SCH MG: 20 TABLET ORAL at 08:29

## 2018-12-27 NOTE — NUR
P.T Note:

P.T evaluation completed and treatment initiated. Please refer to P.T 

evaluation for current functional status. Skilled P.T service is warranted 

to increase safety and independence with functional mobility. DME to be 

determined based on progress.

-------------------------------------------------------------------------------

Addendum: 12/27/18 at 1438 by DAQUAN GRUBER PT

-------------------------------------------------------------------------------

Amended: Links added.

## 2018-12-27 NOTE — NUR
Received report from HARDIK Duarte.  Patient is AAOx4.  Patient is resting in bed with right 
leg elevated.  Patient denies pain and no signs of distress.  Discussed plan of care with 
the patient.  

-------------------------------------------------------------------------------

Addendum: 12/27/18 at 0822 by Alden Duron RN

-------------------------------------------------------------------------------

NURSE NOTES:

## 2018-12-27 NOTE — CONSULTATION
History of Present Illness


General


Date patient seen:  Dec 27, 2018


Chief Complaint:  Lower Extremity Injury





Present Illness


HPI


44 y/o M wtih hx of DM presents to ED on 12/26 with worsening R calf pain, 

edema and skin blistering, difficulty to ambulate and fevers. Sayda referred 

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 

felt like his leg muscle strained.. Upon admission he was noted to have fever, 

tachycardia and leukocytosis. CT done showed likely infected hematoma and was 

taken to OR and underwent I+D





Denies n/v,CP, SOB.


Allergies:  


Coded Allergies:  


     No Known Allergies (Unverified , 12/26/18)





Medication History


No Active Prescriptions or Reported Meds





Patient History


Healthcare decision maker


N


Resuscitation status


Full Code


Advanced Directive on File


No


Patient History Narrative








Pmhx: as above





Shx:  denies smoking, alcohol use or drug use





Fhx: non contributory





Physical Exam


Physical Exam Narrative





General Appearance:  alert, mild distress


Lines, tubes and drains:  peripheral


HEENT:  normocephalic, atraumatic


Neck:  non-tender, normal alignment, supple, normal inspection


Respiratory/Chest:  chest wall non-tender, lungs clear, normal breath sounds, 

no respiratory distress, no accessory muscle use


Cardiovascular/Chest:  regular rhythm, no gallop/murmur, tachycardia


Abdomen:  normal bowel sounds, non tender, soft, no organomegaly, no mass


Extremities:  right leg dressing c/d/i, painful to active motion as expected


Skin Exam:  other - right calf surgical dressings


Neurologic:  CNs II-XII grossly normal, no motor/sensory deficits, alert, 

oriented x 3, responsive, normal mood/affect





Last 24 Hour Vital Signs








  Date Time  Temp Pulse Resp B/P (MAP) Pulse Ox O2 Delivery O2 Flow Rate FiO2


 


12/27/18 12:00 98.0 109 18 147/73 (97) 97   


 


12/27/18 11:47  104      


 


12/27/18 09:00      Room Air  


 


12/27/18 08:29    134/82    


 


12/27/18 08:00 98.8 92 18 139/82 (101) 98   


 


12/27/18 08:00  107      


 


12/27/18 04:00 99.6 110 20 141/84 (103) 97   


 


12/27/18 04:00  112      


 


12/27/18 00:00 99.1 117 20 140/77 (98) 96   


 


12/26/18 23:25 98.3       


 


12/26/18 22:10 98.3 107 18 153/67 96 Nasal Cannula 3 


 


12/26/18 22:00  106 16 148/77 96 Nasal Cannula 3 


 


12/26/18 21:45  110 18 152/75 97 Nasal Cannula 3 


 


12/26/18 21:35  117 16 158/83 98 Simple Mask 8 


 


12/26/18 21:28  122 18 155/79 98 Simple Mask 8 


 


12/26/18 21:23  124 15 150/69 99 Simple Mask 6 


 


12/26/18 21:23      Room Air  


 


12/26/18 21:21 99.6 106 20 152/89 (110) 98   


 


12/26/18 21:20  133 16  95   


 


12/26/18 21:18 99.3 136 18 169/69 99 Simple Mask 6 


 


12/26/18 20:34      Room Air  


 


12/26/18 20:00  106      


 


12/26/18 19:42 100.2       


 


12/26/18 18:00 102.0 116 20 162/85 (110) 98   


 


12/26/18 17:30 101.0 114 19 161/79 99 Room Air  


 


12/26/18 16:44 101.0 114 19 161/79 99 Room Air  


 


12/26/18 16:10  117 26 158/77 99 Room Air  


 


12/26/18 15:06 101.0       


 


12/26/18 13:59 101.8       

















Intake and Output  


 


 12/26/18 12/27/18





 19:00 07:00


 


Intake Total  1300 ml


 


Output Total  40 ml


 


Balance  1260 ml


 


  


 


Intake IV Total  1300 ml


 


Output Estimated Blood Loss  40 ml


 


# Voids 1 1











Laboratory Tests








Test


  12/26/18


23:00 12/27/18


06:15


 


Lactic Acid Level


  1.00 mmol/L


(0.4-2.0) 


 


 


White Blood Count


  


  26.0 K/UL


(4.8-10.8)  *H


 


Red Blood Count


  


  3.89 M/UL


(4.70-6.10)  L


 


Hemoglobin


  


  11.9 G/DL


(14.2-18.0)  L


 


Hematocrit


  


  35.5 %


(42.0-52.0)  L


 


Mean Corpuscular Volume  91 FL (80-99)  


 


Mean Corpuscular Hemoglobin


  


  30.6 PG


(27.0-31.0)


 


Mean Corpuscular Hemoglobin


Concent 


  33.5 G/DL


(32.0-36.0)


 


Red Cell Distribution Width


  


  11.5 %


(11.6-14.8)  L


 


Platelet Count


  


  189 K/UL


(150-450)


 


Mean Platelet Volume


  


  7.4 FL


(6.5-10.1)


 


Neutrophils (%) (Auto)


  


  % (45.0-75.0)


 


 


Lymphocytes (%) (Auto)


  


  % (20.0-45.0)


 


 


Monocytes (%) (Auto)   % (1.0-10.0)  


 


Eosinophils (%) (Auto)   % (0.0-3.0)  


 


Basophils (%) (Auto)   % (0.0-2.0)  


 


Differential Total Cells


Counted 


  100  


 


 


Neutrophils % (Manual)  90 % (45-75)  H


 


Lymphocytes % (Manual)  5 % (20-45)  L


 


Monocytes % (Manual)  5 % (1-10)  


 


Eosinophils % (Manual)  0 % (0-3)  


 


Basophils % (Manual)  0 % (0-2)  


 


Band Neutrophils  0 % (0-8)  


 


Platelet Estimate  Adequate  


 


Platelet Morphology  Normal  


 


Hypochromasia  1+  


 


Prothrombin Time


  


  12.0 SEC


(9.30-11.50)  H


 


Prothromb Time International


Ratio 


  1.1 (0.9-1.1)  


 


 


Activated Partial


Thromboplast Time 


  46 SEC (23-33)


H


 


Sodium Level


  


  133 MMOL/L


(136-145)  L


 


Potassium Level


  


  3.4 MMOL/L


(3.5-5.1)  L


 


Chloride Level


  


  99 MMOL/L


()


 


Carbon Dioxide Level


  


  18 MMOL/L


(21-32)  L


 


Anion Gap


  


  16 mmol/L


(5-15)  H


 


Blood Urea Nitrogen


  


  24 mg/dL


(7-18)  H


 


Creatinine


  


  0.8 MG/DL


(0.55-1.30)


 


Estimat Glomerular Filtration


Rate 


  > 60 mL/min


(>60)


 


Glucose Level


  


  299 MG/DL


()  H


 


Hemoglobin A1c


  


  12.3 %


(4.3-6.0)  H


 


Calcium Level


  


  7.8 MG/DL


(8.5-10.1)  L


 


Total Bilirubin


  


  0.6 MG/DL


(0.2-1.0)


 


Aspartate Amino Transf


(AST/SGOT) 


  19 U/L (15-37)


 


 


Alanine Aminotransferase


(ALT/SGPT) 


  19 U/L (12-78)


 


 


Alkaline Phosphatase


  


  153 U/L


()  H


 


Total Protein


  


  6.6 G/DL


(6.4-8.2)


 


Albumin


  


  1.7 G/DL


(3.4-5.0)  L


 


Globulin  4.9 g/dL  


 


Albumin/Globulin Ratio


  


  0.3 (1.0-2.7)


L








Height (Feet):  5


Height (Inches):  3.00


Weight (Pounds):  142


Medications





Current Medications








 Medications


  (Trade)  Dose


 Ordered  Sig/Shae


 Route


 PRN Reason  Start Time


 Stop Time Status Last Admin


Dose Admin


 


 Acetaminophen


  (Tylenol)  650 mg  Q4H  PRN


 ORAL


 Mild Pain (Pain Scale 1-3)  12/26/18 18:45


 1/25/19 18:44  12/26/18 19:12


 


 


 Al Hydroxide/Mg


 Hydroxide


  (Mylanta II)  30 ml  Q6H  PRN


 ORAL


 dyspepsia  12/26/18 18:45


 1/25/19 18:44   


 


 


 Dextrose


  (Dextrose 50%)  25 ml  Q30M  PRN


 IV


 Hypoglycemia  12/26/18 20:00


 1/25/19 19:59   


 


 


 Dextrose


  (Dextrose 50%)  50 ml  Q30M  PRN


 IV


 Hypoglycemia  12/26/18 20:00


 1/25/19 19:59   


 


 


 Diphenhydramine


 HCl


  (Benadryl)  12.5 mg  Q6H  PRN


 IVP


 Itching/Pruritis  12/26/18 21:45


 1/25/19 21:44   


 


 


 Docusate Sodium


  (Colace)  100 mg  EVERY 12  HOURS


 ORAL


   12/26/18 21:00


 1/25/19 20:59  12/27/18 08:29


 


 


 Famotidine


  (Pepcid)  40 mg  DAILY


 ORAL


   12/27/18 09:00


 1/26/19 08:59  12/27/18 08:30


 


 


 Heparin Sodium


  (Porcine)


  (Heparin 5000


 units/ml)  5,000 units  EVERY 8  HOURS


 SUBQ


   12/26/18 22:00


 1/25/19 21:59  12/27/18 06:12


 


 


 Hydralazine HCl


  (Apresoline)  10 mg  Q6H  PRN


 ORAL


 hypertension  12/26/18 20:00


 1/25/19 19:59   


 


 


 Hydromorphone HCl


  (Dilaudid)  0.5 mg  Q3H  PRN


 IVP


 For Pain  12/26/18 18:45


 1/2/19 18:44   


 


 


 Hydromorphone HCl


  (Dilaudid)  1 mg  Q3H  PRN


 IVP


 Moderate Pain (Pain Scale 4-6)  12/26/18 18:45


 1/2/19 18:44  12/27/18 12:00


 


 


 Hydromorphone HCl


  (Dilaudid)  2 mg  Q3H  PRN


 IVP


 Severe Pain (Pain Scale 7-10)  12/26/18 18:45


 1/2/19 18:44  12/26/18 22:55


 


 


 Insulin Aspart


  (NovoLOG)    BEFORE MEALS AND  HS


 SUBQ


   12/26/18 21:00


 1/25/19 20:59  12/27/18 11:52


 


 


 Iopamidol


  (Isovue-370


 150ml)  150 ml  NOW  PRN


 INJ


 Radiology Procedure  12/26/18 14:30


 12/28/18 14:19   


 


 


 Lisinopril


  (Prinivil)  20 mg  DAILY


 ORAL


   12/27/18 09:00


 1/26/19 08:59  12/27/18 08:29


 


 


 Lorazepam


  (Ativan 2mg/ml


 1ml)  0.5 mg  Q4H  PRN


 IV


 For Anxiety  12/26/18 18:45


 1/2/19 18:44   


 


 


 Magnesium


 Hydroxide


  (Mom)  30 ml  BIDPRN  PRN


 ORAL


 Constipation  12/26/18 21:45


 1/25/19 21:44   


 


 


 Ondansetron HCl


  (Zofran)  4 mg  Q6H  PRN


 IVP


 Nausea & Vomiting  12/26/18 18:45


 1/25/19 18:44   


 


 


 Piperacillin Sod/


 Tazobactam Sod


 3.375 gm/Sodium


 Chloride  110 ml @ 


 27.5 mls/hr  Q8H


 IVPB


   12/26/18 20:00


 1/2/19 19:59  12/27/18 13:01


 


 


 Sodium Chloride  1,000 ml @ 


 125 mls/hr  Q8H


 IVLG


   12/26/18 19:39


 1/25/19 19:38  12/27/18 11:45


 


 


 Temazepam


  (Restoril)  7.5 mg  DAILYPRN  PRN


 ORAL


 Insomnia  12/26/18 21:45


 1/2/19 21:44   


 


 


 Vancomycin HCl


  (Vanco rx to


 dose)  1 ea  DAILY  PRN


 MISC


 Per rx protocol  12/26/18 20:00


 1/25/19 19:59   


 


 


 Vancomycin/Sodium


 Chloride  250 ml @ 


 166.667


 mls/hr  Q8H


 IVPB


   12/27/18 05:00


 1/1/19 04:59  12/27/18 13:36


 











Assessment/Plan


Assessment/Plan


Abx:


IV Vancomycin 12/26-


Zosyn 12/26-





Assessment:


Sepsis 2ry infected R leg/ infected hematoma/ abscess/necrotic myositis


   -12/26 SP I+D


             --OR findings: there was a large foul-smelling abscess evacuated.  

The abscess was evacuated and fascia was identified with edema but no necrosis.

  The fascia was incised and the muscle was identified.  There was some areas 

of muscle necrosis, which required debridement. The remainder of the extremity 

was significantly edematous, but without other identifiable abscess.


   -CT leg: Fluid collection in the soft tissues of the right lateral upper 

calf which may represent evolution of a prior intramuscular hematoma, 

especially given history of remote trauma. There is concern for superimposed 

infection/abscess, especially given skin thickening and subcutaneous stranding 

suggestive of a cellulitis. Correlate clinically.


 


Fever, improving


Leukocytosis





DM





Plan:


-COntinue empiric IV Vancomycin and Zosyn pending cultures


-f/u cx


-Monitor CBC/CMP, temperatures


-wound care


-Sx f.u





Thank you for this consultation. Will continue to follow along with you.


Discussed with HARDIK.











Zaria Duarte M.D. Dec 27, 2018 13:50

## 2018-12-27 NOTE — NUR
NURSE NOTES:

Pt temperature noted to be increased to 101.8. PRN tylenol administered per orders. Will 
continue to monitor.

## 2018-12-27 NOTE — NUR
NURSE NOTES:

Made second attempt to contact Dr. CHELSIE Hernandez though his office number [phone].  
Spoke with Rachael at 13:50.  Awaiting for follow up call.

-------------------------------------------------------------------------------

Addendum: 12/27/18 at 1805 by Alden Duron RN

-------------------------------------------------------------------------------

Amend:

Wrong patient

## 2018-12-27 NOTE — NUR
NURSE NOTES:

Report received from Mindi DYE. Pt is resting in bed in stable condition. Pt is awake, 
alert, and oriented x4. Pt is on room air and breathing is even and unlabored. No acute 
distress noted. R leg is elevated per instructions from MD Fox. Pt denies pain at this 
time. IV site is asymptomatic, patent, and intact and running IV fluids at rx rate. Family 
is at bedside. Bed is in lowest position with brake engaged, side rails up x3, and bed alarm 
on. Call light and side table are within reach. Will continue to monitor.

## 2018-12-27 NOTE — NUR
CASE MANAGEMENT:REVIEW



43 YR OLD MALE FROM HOME TO ER



CC: RT  LEG INJURED WEEK AGO. NOW SWOLLEN AND RED. FEVER 



SI: SEPSIS. RT LEG ABSCESS W/POSSIBLE NECROTIZING INFECTION

CELLULITIS. HYPERGLYCEMIA

101.8   123  20   159/94   97% ON RA

WBC+25.5  NA-130   GLUCOSE+393



IS: IV ZOSYN

1L NS BOLUS

IV MORPHINE

IV INSULIN

CT FEMUR

CTA RLE

CXR

BLOOD CX

**: TO TELEMETRY



PLAN:

TO SURGERY FOR I&D RT LEG ABSCESS..WASHOUT...PACKING

-------------------------------------------------------------------------------

Addendum: 12/27/18 at 1210 by JAMSHID PATELN

-------------------------------------------------------------------------------

INTERQUAL CRITERIA MET

## 2018-12-27 NOTE — OPERATIVE NOTE - DICTATED
DATE OF OPERATION:  12/26/2018

PREOPERATIVE DIAGNOSIS:  Right leg cellulitis with abscess and possible

necrotizing infection.



POSTOPERATIVE DIAGNOSIS:  Right leg cellulitis with abscess and possible

necrotizing infection.



OPERATION PERFORMED:

1. Incision and drainage of right leg abscess.

2. Debridement of necrotic muscle with muscle biopsy.

3. Washout of wound 

4. Packing and dressing.



ATTENDING SURGEON:  Tony Fox M.D.



ASSISTANT:  None.



ANESTHESIOLOGIST:  Bhupinder Carlin M.D.



ANESTHESIA:  General GTA.



ESTIMATED BLOOD LOSS:  50 mL.



IV FLUIDS:  Please see anesthesia records.



COMPLICATIONS:  None.



DRAINS:  None.



SPECIMENS:  Muscle biopsy from the right calf.



IMPLANTS:  None.



ANTIBIOTICS:  The patient on scheduled IV Zosyn for acute inflammatory

process.



COMPLICATIONS:  None.



COUNT:  Sponge and needle count correct x2.



INDICATIONS FOR PROCEDURE:  This is a 43-year-old male who presented to the

emergency department complaining of worsening right calf pain.  States

approximately a week ago, he had a work injury which was more of an excessive

extension-flexion injury, which he first noted.  He states that he

continued to work on it and it began to worsen.  Over the past week, he

has had significant pain with edema and swelling of the right lower

extremity.  He presents to the emergency department complaining of

worsening pain, inability to walk and was noted to be febrile,

tachycardic, and severely septic with a leukocytosis to 25,000, and

abnormal chemistries.  CT scan was performed, which identified a fluid

collection soft tissue of the right lateral upper calf with likely

evolution of intramuscular hematoma and concerns for superimposed

infection and abscess given the skin thickening and subcutaneous stranding

suggestive of cellulitis.  On examination, there was skin blistering in

the area with area of fluctuance, significant tenderness on both passive

and active motion as well.  There was edema from the toes to the mid

femur.  Given these findings, there was a high suspicion for potential

necrotizing infection.  An urgent/emergency surgery was indicated and

recommended.  Risks, benefits, and alternatives were discussed with the

patient in detail and plans for wound exploration with debridement,

washout and potential fasciotomy even if necessary.  The patient expressed

understanding and consented for surgery which was performed in the evening

of December 26, 2018.



OPERATIVE NOTE:  The patient taken to the operating room and placed on the

operating table in supine position with bilateral arms out. All bony

prominences were well padded.  SCDs were placed on the left lower

extremity.  Preoperative time-out was taken identifying the patient,

procedure, operative staff, and surgical staff.  General anesthesia was

induced and the patient was intubated.  The right lower extremity was

prepped and draped circumferentially in the standard surgical fashion.  An

area of maximal fluctuance was correlated with the CT findings and a

approximately 6 to 8 inch incision was made over the area of maximal

fluctuance and extended as necessary using a fresh #10 scalpel.  Once

underneath the dermis at the level of the fascia, there was a large

foul-smelling abscess evacuated.  Cultures were sent.  The abscess was

evacuated and fascia was identified with edema but no necrosis.  The

fascia was incised and the muscle was identified.  There was some areas of

muscle necrosis, which required debridement.  A muscle biopsy of the area

was taken to ensure pathological evaluation.  Following this, no other

areas of abscess or concern were noted.  The remainder of the extremity

was significantly edematous, but without other identifiable abscess.  The

abscess did not track any further or deeper.  Given the compartments being

soft at this time decision was made not to proceed with formal fasciotomy,

but rather close monitoring if necessary in the future.  The wound was

irrigated with copious amounts of warm sterile saline.  Once this was

completed, packing and dressings were applied.  The patient tolerated the

procedure well, was extubated and taken to postanesthetic care unit in a

stable condition.









  ______________________________________________

  Tony Fox M.D.





DR:  Jez

D:  12/27/2018 13:16

T:  12/27/2018 17:16

JOB#:  [id number]

CC:



LORA

## 2018-12-27 NOTE — 48 HOUR POST ANESTHESIA EVAL
Post Anesthesia Evaluation


Procedure:  R Leg I and D


Date of Evaluation:  Dec 27, 2018


Airway:  patent


Nausea:  No


Vomiting:  No


Pain Intensity:  0


Hydration Status:  adequate


Cardiopulmonary Status:


at baseline


Mental Status/LOC:  patient returned to baseline


Post-Anesthesia Complications:


0


Follow-up care needed:  N/A - further care as pser primary team











Valencia Lambert MD Dec 27, 2018 12:49

## 2018-12-27 NOTE — GENERAL PROGRESS NOTE
Progress Note


Progress Note


Surgery:





improving.  pain improved.  more comfortable.  still with edema


labs with leukocytosis and noted


dressings changed.  wound without drainage.  wound clean. 


packing and dressings applied





-change packing and dressing BID


-IV abx


-trend labs


keep left elevated!


thank you











Tony Fox Dec 27, 2018 12:56

## 2018-12-27 NOTE — NUR
NURSE NOTES:

Called Dr. Yeh office [phone] and left a message. Per office Dr. DEREK Yeh 
is on vacation and Dr. CHELSIE Yeh on call. Awaiting for response.

## 2018-12-27 NOTE — GENERAL PROGRESS NOTE
Assessment/Plan


Problem List:  


(1) Severe sepsis


Assessment & Plan:  wbc 25, tachy with  and temp of 102 on admit


wbc still elevated


CT done in the ED reviewed and showing concerns for infected hematoma


appreciate surgery eval


s/p I&D yesterday 


dressings c/d/i


cont abx vanco/zosyn


bxc pending


cont ivf


cont pain control IV morphine


ICD Codes:  A41.9 - Sepsis, unspecified organism; R65.20 - Severe sepsis 

without septic shock


SNOMED:  96920821


(2) Abscess of leg, right


Assessment & Plan:  cont abx per above


cx sent


s/p I&D


ICD Codes:  L02.415 - Cutaneous abscess of right lower limb; R65.20 - Severe 

sepsis without septic shock


SNOMED:  381666291


(3) Cellulitis


Assessment & Plan:  cont abx


monitor closely


ICD Codes:  L03.90 - Cellulitis, unspecified


SNOMED:  039249355


Qualifiers:  


   


(4) Lactic acid acidosis


Assessment & Plan:  due to severe sepsis


LA 2.1


recheck LA 


IVF


ICD Codes:  E87.2 - Acidosis


SNOMED:  35961541


(5) Uncontrolled diabetes mellitus


Assessment & Plan:  iss


accuchecks


hgba1c


ICD Codes:  E11.65 - Type 2 diabetes mellitus with hyperglycemia


SNOMED:  50556357, 545812042


Qualifiers:  


   Qualified Codes:  E11.65 - Type 2 diabetes mellitus with hyperglycemia


(6) Essential hypertension


Assessment & Plan:  hydralazine prn


start lisinopril 20mg qday


monitor closely


likely exacerbated due to pain


ICD Codes:  I10 - Essential (primary) hypertension


SNOMED:  33775529


(7) Uncontrolled hypertension


Assessment & Plan:  hydralazine prn


started acei


monitor closely


ICD Codes:  I10 - Essential (primary) hypertension


SNOMED:  54903401, 00005229


(8) Hyponatremia


Assessment & Plan:  Na 130 on admit


improving with fluids, now Na 133


fluids


should improved


likely due to dehydration


ICD Codes:  E87.1 - Hypo-osmolality and hyponatremia


SNOMED:  96220017


Status:  stable


Assessment/Plan


DVT Prophylaxis:   SCD, HSQ


Code Status:            Full


I have personally reviewed all labs, medications and imaging





Hospital Classification Declaration: Based on this initial evaluation, and 

depending on the patient's clinical course, I anticipate that this patient will 

require hospitalization for [2-3 midnights for severe sepsis/right leg abscess 

likely requiring surgery and close respiratory/hemodynamic monitoring.





Disposition: Once the patient is stable to leave the hospital, I anticipate the 

patient will likely be discharged to the following environment: home with HH vs 

SNF





I spent 52 minutes on this patient's case, and 40 minutes were dedicated to 

counseling and/or care coordination. Discussed with patient/family, nursing 

staff, SW/CM, and consultants regarding clinical status, treatment course, and 

disposition planning.





Time of note may not reflect time of encounter.


------





Date of Discussion: 12/27/18 





A face-to-face discussion with the patient regarding the patient's advanced 

care planning took place during this hospitalization on the above date. The 

discussion included the explanation and discussion of advance directives and 

associated forms/documents, as well as the patient's current code status. We 

also discussed at length the patient's medical conditions (both acute and 

chronic), general prognosis, treatment options, and goals of care. The 

following summarizes the discussion:





Advance Care Planning/Goals of Care:


- Will attempt to fill out an AD and/or POLST with the patient prior to 

discharge, if not already completed


- Continue current evaluation and management of any acute and chronic medical 

issues


- Will continue to support the patient/family


- Will continue to discuss both short- and long-term goals of care





DPOA-HC/Surrogate Decision Maker:


None currently appointed 





Code Status:


Full Code 





AD Forms/Documents Completed:


Deferred 





A total of 31 minutes was spent on this discussion, including counseling, 

answering questions, and completing, if any, pertinent advanced care planning 

forms/documents.





Subjective


Date patient seen:  Dec 27, 2018


Time patient seen:  08:11


Allergies:  


Coded Allergies:  


     No Known Allergies (Unverified , 12/26/18)


Subjective


f/u right leg abscess/infected hematoma s/p I&D


denies any complaints, states pain is well controlled, denies any fevers or 

chills overnight


denies any abd pain, no cp or sob, no diarrhea/constipation





ROS: 14 point ROS negative except per the above subjective





Objective





Last 24 Hour Vital Signs








  Date Time  Temp Pulse Resp B/P (MAP) Pulse Ox O2 Delivery O2 Flow Rate FiO2


 


12/27/18 04:00 99.6 110 20 141/84 (103) 97   


 


12/27/18 04:00  112      


 


12/27/18 00:00 99.1 117 20 140/77 (98) 96   


 


12/26/18 23:25 98.3       


 


12/26/18 22:10 98.3 107 18 153/67 96 Nasal Cannula 3 


 


12/26/18 22:00  106 16 148/77 96 Nasal Cannula 3 


 


12/26/18 21:45  110 18 152/75 97 Nasal Cannula 3 


 


12/26/18 21:35  117 16 158/83 98 Simple Mask 8 


 


12/26/18 21:28  122 18 155/79 98 Simple Mask 8 


 


12/26/18 21:23  124 15 150/69 99 Simple Mask 6 


 


12/26/18 21:23      Room Air  


 


12/26/18 21:21 99.6 106 20 152/89 (110) 98   


 


12/26/18 21:20  133 16  95   


 


12/26/18 21:18 99.3 136 18 169/69 99 Simple Mask 6 


 


12/26/18 20:34      Room Air  


 


12/26/18 20:00  106      


 


12/26/18 19:42 100.2       


 


12/26/18 18:00 102.0 116 20 162/85 (110) 98   


 


12/26/18 17:30 101.0 114 19 161/79 99 Room Air  


 


12/26/18 16:44 101.0 114 19 161/79 99 Room Air  


 


12/26/18 16:10  117 26 158/77 99 Room Air  


 


12/26/18 15:06 101.0       


 


12/26/18 13:59 101.8       


 


12/26/18 12:50 101.6 121 26 163/105 100 Room Air  


 


12/26/18 12:44 100.4 123 20 159/94 97 Room Air  

















Intake and Output  


 


 12/26/18 12/27/18





 19:00 07:00


 


Intake Total  1300 ml


 


Output Total  40 ml


 


Balance  1260 ml


 


  


 


Intake IV Total  1300 ml


 


Output Estimated Blood Loss  40 ml


 


# Voids 1 1








Laboratory Tests


12/26/18 13:20: 


White Blood Count 25.5*H, Red Blood Count 4.83, Hemoglobin 14.8, Hematocrit 44.7

, Mean Corpuscular Volume 92, Mean Corpuscular Hemoglobin 30.6, Mean 

Corpuscular Hemoglobin Concent 33.2, Red Cell Distribution Width 11.5L, 

Platelet Count 206, Mean Platelet Volume 7.8, Neutrophils (%) (Auto) , 

Lymphocytes (%) (Auto) , Monocytes (%) (Auto) , Eosinophils (%) (Auto) , 

Basophils (%) (Auto) , Differential Total Cells Counted 100, Neutrophils % (

Manual) 86H, Lymphocytes % (Manual) 1L, Monocytes % (Manual) 4, Eosinophils % (

Manual) 0, Basophils % (Manual) 0, Band Neutrophils 9H, Platelet Estimate 

Adequate, Platelet Morphology Normal, Prothrombin Time 11.6H, Prothromb Time 

International Ratio 1.1, Activated Partial Thromboplast Time 46H, Urine Color 

Pale yellow, Urine Appearance Clear, Urine pH 5, Urine Specific Gravity 1.020, 

Urine Protein 2+H, Urine Glucose (UA) 4+H, Urine Ketones 4+H, Urine Blood 3+H, 

Urine Nitrite Negative, Urine Bilirubin Negative, Urine Urobilinogen Normal, 

Urine Leukocyte Esterase 1+H, Urine RBC 5-10H, Urine WBC 0-2, Urine Squamous 

Epithelial Cells Occasional, Urine Bacteria Few, Sodium Level 130L, Potassium 

Level 4.2, Chloride Level 92L, Carbon Dioxide Level 19L, Anion Gap 19H, Blood 

Urea Nitrogen 19H, Creatinine 0.9, Estimat Glomerular Filtration Rate > 60, 

Glucose Level 393H, Lactic Acid Level 2.10H, Calcium Level 9.3, Total Bilirubin 

0.7, Aspartate Amino Transf (AST/SGOT) 15, Alanine Aminotransferase (ALT/SGPT) 

27, Alkaline Phosphatase 172H, Total Creatine Kinase 46, Creatine Kinase MB < 

0.5, Creatine Kinase MB Relative Index 1.0, Troponin I 0.000, Total Protein 7.8

, Albumin 2.5L, Globulin 5.3


12/26/18 23:00: Lactic Acid Level 1.00


12/27/18 06:15: 


White Blood Count 26.0*H, Red Blood Count 3.89L, Hemoglobin 11.9L, Hematocrit 

35.5L, Mean Corpuscular Volume 91, Mean Corpuscular Hemoglobin 30.6, Mean 

Corpuscular Hemoglobin Concent 33.5, Red Cell Distribution Width 11.5L, 

Platelet Count 189, Mean Platelet Volume 7.4, Neutrophils (%) (Auto) , 

Lymphocytes (%) (Auto) , Monocytes (%) (Auto) , Eosinophils (%) (Auto) , 

Basophils (%) (Auto) , Differential Total Cells Counted 100, Neutrophils % (

Manual) 90H, Lymphocytes % (Manual) 5L, Monocytes % (Manual) 5, Eosinophils % (

Manual) 0, Basophils % (Manual) 0, Band Neutrophils 0, Platelet Estimate 

Adequate, Platelet Morphology Normal, Prothrombin Time 12.0H, Prothromb Time 

International Ratio 1.1, Activated Partial Thromboplast Time 46H, Sodium Level 

133L, Potassium Level 3.4L, Chloride Level 99, Carbon Dioxide Level 18L, Anion 

Gap 16H, Blood Urea Nitrogen 24H, Creatinine 0.8, Estimat Glomerular Filtration 

Rate > 60, Glucose Level 299H, Calcium Level 7.8L, Total Bilirubin 0.6, 

Aspartate Amino Transf (AST/SGOT) 19, Alanine Aminotransferase (ALT/SGPT) 19, 

Alkaline Phosphatase 153H, Total Protein 6.6, Albumin 1.7L, Globulin 4.9, 

Hypochromasia 1+, Hemoglobin A1c 12.3H, Albumin/Globulin Ratio 0.3L


Height (Feet):  5


Height (Inches):  3.00


Weight (Pounds):  142


Objective





General Appearance:  alert, mild distress


Lines, tubes and drains:  peripheral


HEENT:  normocephalic, atraumatic


Neck:  non-tender, normal alignment, supple, normal inspection


Respiratory/Chest:  chest wall non-tender, lungs clear, normal breath sounds, 

no respiratory distress, no accessory muscle use


Cardiovascular/Chest:  regular rhythm, no gallop/murmur, tachycardia


Abdomen:  normal bowel sounds, non tender, soft, no organomegaly, no mass


Extremities:  right leg dressing c/d/i, painful to active motion as expected


Skin Exam:  other - right calf with cellulitis/erythema, blistering.


Neurologic:  CNs II-XII grossly normal, no motor/sensory deficits, alert, 

oriented x 3, responsive, normal mood/affect











Celi Kilpatrick MD Dec 27, 2018 08:14

## 2018-12-28 VITALS — SYSTOLIC BLOOD PRESSURE: 128 MMHG | DIASTOLIC BLOOD PRESSURE: 75 MMHG

## 2018-12-28 VITALS — SYSTOLIC BLOOD PRESSURE: 138 MMHG | DIASTOLIC BLOOD PRESSURE: 78 MMHG

## 2018-12-28 VITALS — DIASTOLIC BLOOD PRESSURE: 7 MMHG | SYSTOLIC BLOOD PRESSURE: 144 MMHG

## 2018-12-28 VITALS — DIASTOLIC BLOOD PRESSURE: 83 MMHG | SYSTOLIC BLOOD PRESSURE: 138 MMHG

## 2018-12-28 VITALS — SYSTOLIC BLOOD PRESSURE: 152 MMHG | DIASTOLIC BLOOD PRESSURE: 85 MMHG

## 2018-12-28 VITALS — DIASTOLIC BLOOD PRESSURE: 82 MMHG | SYSTOLIC BLOOD PRESSURE: 142 MMHG

## 2018-12-28 LAB
ADD MANUAL DIFF: YES
ANION GAP SERPL CALC-SCNC: 7 MMOL/L (ref 5–15)
BUN SERPL-MCNC: 19 MG/DL (ref 7–18)
CALCIUM SERPL-MCNC: 7.6 MG/DL (ref 8.5–10.1)
CHLORIDE SERPL-SCNC: 98 MMOL/L (ref 98–107)
CO2 SERPL-SCNC: 24 MMOL/L (ref 21–32)
CREAT SERPL-MCNC: 0.8 MG/DL (ref 0.55–1.3)
ERYTHROCYTE [DISTWIDTH] IN BLOOD BY AUTOMATED COUNT: 11.5 % (ref 11.6–14.8)
HCT VFR BLD CALC: 35.8 % (ref 42–52)
HGB BLD-MCNC: 11.9 G/DL (ref 14.2–18)
MCV RBC AUTO: 92 FL (ref 80–99)
PLATELET # BLD: 181 K/UL (ref 150–450)
POTASSIUM SERPL-SCNC: 4 MMOL/L (ref 3.5–5.1)
RBC # BLD AUTO: 3.91 M/UL (ref 4.7–6.1)
SODIUM SERPL-SCNC: 129 MMOL/L (ref 136–145)
WBC # BLD AUTO: 25.2 K/UL (ref 4.8–10.8)

## 2018-12-28 RX ADMIN — HEPARIN SODIUM SCH UNITS: 5000 INJECTION INTRAVENOUS; SUBCUTANEOUS at 22:00

## 2018-12-28 RX ADMIN — SODIUM CHLORIDE SCH MLS/HR: 9 INJECTION, SOLUTION INTRAVENOUS at 22:21

## 2018-12-28 RX ADMIN — INSULIN ASPART SCH UNITS: 100 INJECTION, SOLUTION INTRAVENOUS; SUBCUTANEOUS at 16:58

## 2018-12-28 RX ADMIN — LISINOPRIL SCH MG: 20 TABLET ORAL at 08:55

## 2018-12-28 RX ADMIN — DOCUSATE SODIUM SCH MG: 100 CAPSULE, LIQUID FILLED ORAL at 08:55

## 2018-12-28 RX ADMIN — DOCUSATE SODIUM SCH MG: 100 CAPSULE, LIQUID FILLED ORAL at 20:46

## 2018-12-28 RX ADMIN — DIPHENHYDRAMINE HYDROCHLORIDE PRN MG: 50 INJECTION INTRAMUSCULAR; INTRAVENOUS at 09:01

## 2018-12-28 RX ADMIN — INSULIN ASPART SCH UNITS: 100 INJECTION, SOLUTION INTRAVENOUS; SUBCUTANEOUS at 12:29

## 2018-12-28 RX ADMIN — SODIUM CHLORIDE SCH MLS/HR: 9 INJECTION, SOLUTION INTRAVENOUS at 06:45

## 2018-12-28 RX ADMIN — VANCOMYCIN HCL-SODIUM CHLORIDE IV SOLN 1.25 GM/250ML-0.9% SCH MLS/HR: 1.25-0.9/25 SOLUTION at 23:38

## 2018-12-28 RX ADMIN — DIPHENHYDRAMINE HYDROCHLORIDE PRN MG: 50 INJECTION INTRAMUSCULAR; INTRAVENOUS at 04:26

## 2018-12-28 RX ADMIN — DIPHENHYDRAMINE HYDROCHLORIDE PRN MG: 50 INJECTION INTRAMUSCULAR; INTRAVENOUS at 14:48

## 2018-12-28 RX ADMIN — HEPARIN SODIUM SCH UNITS: 5000 INJECTION INTRAVENOUS; SUBCUTANEOUS at 14:29

## 2018-12-28 RX ADMIN — INSULIN ASPART SCH UNITS: 100 INJECTION, SOLUTION INTRAVENOUS; SUBCUTANEOUS at 06:13

## 2018-12-28 RX ADMIN — DEXTROSE MONOHYDRATE SCH MLS/HR: 50 INJECTION, SOLUTION INTRAVENOUS at 20:46

## 2018-12-28 RX ADMIN — DEXTROSE MONOHYDRATE SCH MLS/HR: 50 INJECTION, SOLUTION INTRAVENOUS at 12:40

## 2018-12-28 RX ADMIN — INSULIN ASPART SCH UNITS: 100 INJECTION, SOLUTION INTRAVENOUS; SUBCUTANEOUS at 20:51

## 2018-12-28 RX ADMIN — SODIUM CHLORIDE SCH MLS/HR: 9 INJECTION, SOLUTION INTRAVENOUS at 14:30

## 2018-12-28 RX ADMIN — HEPARIN SODIUM SCH UNITS: 5000 INJECTION INTRAVENOUS; SUBCUTANEOUS at 06:12

## 2018-12-28 RX ADMIN — DEXTROSE MONOHYDRATE SCH MLS/HR: 50 INJECTION, SOLUTION INTRAVENOUS at 04:26

## 2018-12-28 RX ADMIN — DIPHENHYDRAMINE HYDROCHLORIDE PRN MG: 50 INJECTION INTRAMUSCULAR; INTRAVENOUS at 20:47

## 2018-12-28 NOTE — NUR
NURSE NOTES:

Notified Dr. Celi Kilpatrick patient blood ecmbv=637 and providing 10 units of Novolog.

-------------------------------------------------------------------------------

Addendum: 12/28/18 at 1624 by PEPPER SINGH RN

-------------------------------------------------------------------------------

Error:  Providing 12 units of Novolog.

## 2018-12-28 NOTE — CONSULTATION
Consult Note


Consult Note


HEMATOLOGY-ONCOLOGY CONSULTATION 





REFERRING PHYSICIAN: Federico Ballesteros 


REASON FOR CONSULT: Leukocytosis 


DATE OF CONSULT: 12/28/2018 





HPI


43 year old male with h/o DM presents with complaints of worsening right calf 

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 Patient did not do much about it, 

continued to work however mentioned worsening pain over the day. States he had 

a cut from the fall and that it was increasingly painful and difficult to walk. 

Since then he has been having worsening swelling and pain which then led to 

subjective fevers and chills. Denies any n/v/cp/sob. CT done in the ED reviewed 

and showing concerns for infected hematoma. Hematology service consulted for 

the evaluation of leukocytosis.  





Social hx: denies smoking, alcohol use or drug use


Family hx: denies significant past fam hx





Allergies:  


Coded Allergies:  


     No Known Allergies (Unverified , 12/26/18)





Medication History


No Active Prescriptions or Reported Meds





Patient History


History Provided By:  Patient


Healthcare decision maker


N


Resuscitation status





Advanced Directive on File








Review of Systems


All Other Systems:  negative except mentioned in HPI


ROS Narrative


14 point ROS reviewed and negative except per above HPI





Physical Exam


General Appearance:  alert, mild distress


Vitals:  Have been reviewed. 


Lines, tubes and drains:  peripheral


HEENT:  normocephalic, atraumatic


Neck:  non-tender, normal alignment, supple, normal inspection


Respiratory/Chest:  chest wall non-tender, lungs clear, normal breath sounds, 

no respiratory distress, no accessory muscle use


Cardiovascular/Chest:  regular rhythm, no gallop/murmur, tachycardia


Abdomen:  normal bowel sounds, non tender, soft, no organomegaly, no mass


Extremities:  other - right calf TTP+, painful to active motion


Skin Exam:  other - right calf with cellulitis/erythema, blistering.


Neurologic:  CNs II-XII grossly normal, no motor/sensory deficits, alert, 

oriented x 3, responsive, normal mood/affect





MEDICATIONS: Current meds have been reviewed. 





LABS: wbc 26.0 hgb 11.9 plt 189  





IMAGING: 


CXR --> No acute disease 








ASSESSMENT AND RECOMMENDATIONS





# Leukocytosis. Secondary to underlying infection, sepsis, cellulitis. 


--> Peripheral has been ordered, results are pending 


--> Medications have been reviewed 


--> Imaging has been reviewed. CXR shows no acute disease. 


--> Blood cultures and urine cultures prn


--> has been started on abx, empiric treatment 


# Anemia of chronic disease due to underlying chronic medical issues, 

multifactorial. 


--> Current Hgb >11, no w/u required at this time. 


--> Monitor for stability 


# Sepsis. ID is follwoing, appreciate recs. 


--> On abx. 


--> IVF 


# Abscess of R leg. Surgery is following, appreciate recs. 


--> S/P I&D


--> abx 


--> CT done in the ED reviewed and showing concerns for infected hematoma


# Cellulitis


# Lactic acid acidosis. Due to severe sepsis. 


# DM


# HTN. 


# DVT prophylaxis. 








GREATLY APPRECIATE CONSULTATION. 





Date and time note is entered does not necessarily reflect the time of 

encounter.











Quintin Yeh MD Dec 28, 2018 09:42

## 2018-12-28 NOTE — CARDIOLOGY REPORT
--------------- APPROVED REPORT --------------





EKG Measurement

Heart [id number]

MN [id number]

[id number]

[id number]

[id number]





Sinus tachycardia

Left axis deviation

Pulmonary disease pattern

Abnormal ECG

## 2018-12-28 NOTE — NUR
HAND-OFF: 

Report given to Ziggy RN. Pt is resting in bed in stable condition. No acute distress noted. 
Endorsed plan of care.

## 2018-12-28 NOTE — NUR
CASE MANAGEMENT:REVIEW



12/28/18

SI: SEPSIS. RT LEG ABSCESS W/POSSIBLE NECROTIZING INFECTION

S/P INCISION,DRAINAGE AND DEBRIDEMENT

99.3   113  20  138/83   98% ON RA

WBC+25.2     NA-129



IS: IVF@75/HR

IV VANCOMYCIN Q8HRS

IV ZOSYN Q8HRS

LISINOPRIL  PO QD

HEPARIN SQ Q8HRS

IV DILAUDID Q3HRS PRN

**: TELEMETRY STATUS

## 2018-12-28 NOTE — NUR
HAND-OFF: 

Report given to Bruna Sanders RN.  Patient sitting up in bed, awake and alert, watching 
television, bed in lowest position, call light within reach, significant other (Anahy) at 
bedside, pain treated with Dilaudid, right foot floating on pillow, bandages on right foot 
changed twice on day shift, IV patent in right AC running NS@12 cc/hour, cot in room for 
Anahy.

## 2018-12-28 NOTE — GENERAL PROGRESS NOTE
Progress Note


Progress Note


no acute events.  edema improving.  still with leukocytosis. +fevers.  





-cont iv abx


-change dressings as ordered


-ambulate and out of bed.  


-leg elevated when in bed


-cont current care


-trend labs


thank you











Tony Fox Dec 28, 2018 15:59

## 2018-12-28 NOTE — INFECTIOUS DISEASES PROG NOTE
Assessment/Plan


Assessment/Plan


Abx:


IV Vancomycin 12/26-


Zosyn 12/26-





Assessment:


Sepsis 2ry infected R leg/ infected hematoma/ abscess/necrotic myositis


   -12/26 SP I+D


             --OR findings: there was a large foul-smelling abscess evacuated.  

The abscess was evacuated and fascia was identified with edema but no necrosis.

  The fascia was incised and the muscle was identified.  There was some areas 

of muscle necrosis, which required debridement. The remainder of the extremity 

was significantly edematous, but without other identifiable abscess.


   -CT leg: Fluid collection in the soft tissues of the right lateral upper 

calf which may represent evolution of a prior intramuscular hematoma, 

especially given history of remote trauma. There is concern for superimposed 

infection/abscess, especially given skin thickening and subcutaneous stranding 

suggestive of a cellulitis. Correlate clinically.





 - 12/26 OR Cx - Pend GS (Many GPC)


 


Fever, improving


Leukocytosis





DM





Plan:


-Continue empiric IV Vancomycin and Zosyn pending cultures


-f/u cx


-Monitor CBC/CMP, temperatures


-wound care


-Sx f.u





Will continue to follow along with you.





Subjective


Allergies:  


Coded Allergies:  


     No Known Allergies (Unverified , 12/26/18)


Subjective


Patient with low grade fever yesterday


Still with leukocytosis SP I and D


Pain controlled





Objective


Vital Signs





Last 24 Hour Vital Signs








  Date Time  Temp Pulse Resp B/P (MAP) Pulse Ox O2 Delivery O2 Flow Rate FiO2


 


12/28/18 04:00  99      


 


12/28/18 04:00 99.3 102 19 152/85 (107) 96   


 


12/28/18 00:00  105      


 


12/28/18 00:00 98.4 101 18 144/7 (52) 97   


 


12/27/18 21:48 99.1       


 


12/27/18 21:00      Room Air  


 


12/27/18 20:00  114      


 


12/27/18 20:00 99.9 117 19 153/78 (103) 95   


 


12/27/18 16:00 98.6 111 18 142/75 (97) 98   


 


12/27/18 15:36  112      


 


12/27/18 12:00 98.0 109 18 147/73 (97) 97   


 


12/27/18 11:47  104      


 


12/27/18 09:00      Room Air  


 


12/27/18 08:29    134/82    


 


12/27/18 08:00 98.8 92 18 139/82 (101) 98   


 


12/27/18 08:00  107      








Height (Feet):  5


Height (Inches):  3.00


Weight (Pounds):  142


Objective


General Appearance:  NAD


HEENT:  normocephalic, atraumatic, MMM, EOMI


Respiratory/Chest:  CTAB, No W


Cardiovascular/Chest:  regular rhythm, No M/R/G


Abdomen:  normal bowel sounds, non tender, soft, no organomegaly, no mass


Extremities:  right leg dressing c/d/i, painful to active motion as expected





Microbiology








 Date/Time


Source Procedure


Growth Status


 


 


 12/26/18 13:35


Blood Blood Culture - Preliminary


NO GROWTH AFTER 24 HOURS Resulted


 


 12/26/18 13:20


Blood Blood Culture - Preliminary


NO GROWTH AFTER 24 HOURS Resulted





 12/26/18 20:40


Other Gram Stain - Final Resulted


 


 12/26/18 20:40


Other Aerobic Culture


Pending Resulted











Laboratory Tests








Test


  12/27/18


20:20


 


Vancomycin Level Trough


  8.2 ug/mL


(5.0-12.0)











Current Medications








 Medications


  (Trade)  Dose


 Ordered  Sig/Shae


 Route


 PRN Reason  Start Time


 Stop Time Status Last Admin


Dose Admin


 


 Acetaminophen


  (Tylenol)  650 mg  Q4H  PRN


 ORAL


 Mild Pain/Temp > 100.5  12/27/18 20:45


 1/26/19 20:44  12/27/18 21:18


 


 


 Al Hydroxide/Mg


 Hydroxide


  (Mylanta II)  30 ml  Q6H  PRN


 ORAL


 dyspepsia  12/26/18 18:45


 1/25/19 18:44   


 


 


 Dextrose


  (Dextrose 50%)  25 ml  Q30M  PRN


 IV


 Hypoglycemia  12/26/18 20:00


 1/25/19 19:59   


 


 


 Dextrose


  (Dextrose 50%)  50 ml  Q30M  PRN


 IV


 Hypoglycemia  12/26/18 20:00


 1/25/19 19:59   


 


 


 Diphenhydramine


 HCl


  (Benadryl)  12.5 mg  Q6H  PRN


 IVP


 Itching/Pruritis  12/26/18 21:45


 1/25/19 21:44   


 


 


 Docusate Sodium


  (Colace)  100 mg  EVERY 12  HOURS


 ORAL


   12/26/18 21:00


 1/25/19 20:59  12/27/18 21:18


 


 


 Famotidine


  (Pepcid)  40 mg  DAILY


 ORAL


   12/27/18 09:00


 1/26/19 08:59  12/27/18 08:30


 


 


 Heparin Sodium


  (Porcine)


  (Heparin 5000


 units/ml)  5,000 units  EVERY 8  HOURS


 SUBQ


   12/26/18 22:00


 1/25/19 21:59  12/28/18 06:12


 


 


 Hydralazine HCl


  (Apresoline)  10 mg  Q6H  PRN


 ORAL


 hypertension  12/26/18 20:00


 1/25/19 19:59   


 


 


 Hydromorphone HCl


  (Dilaudid)  0.5 mg  Q3H  PRN


 IVP


 For Pain  12/26/18 18:45


 1/2/19 18:44   


 


 


 Hydromorphone HCl


  (Dilaudid)  1 mg  Q3H  PRN


 IVP


 Moderate Pain (Pain Scale 4-6)  12/26/18 18:45


 1/2/19 18:44  12/28/18 04:26


 


 


 Hydromorphone HCl


  (Dilaudid)  2 mg  Q3H  PRN


 IVP


 Severe Pain (Pain Scale 7-10)  12/26/18 18:45


 1/2/19 18:44  12/26/18 22:55


 


 


 Insulin Aspart


  (NovoLOG)    BEFORE MEALS AND  HS


 SUBQ


   12/26/18 21:00


 1/25/19 20:59  12/28/18 06:13


 


 


 Iopamidol


  (Isovue-370


 150ml)  150 ml  NOW  PRN


 INJ


 Radiology Procedure  12/26/18 14:30


 12/28/18 14:19   


 


 


 Lisinopril


  (Prinivil)  20 mg  DAILY


 ORAL


   12/27/18 09:00


 1/26/19 08:59  12/27/18 08:29


 


 


 Lorazepam


  (Ativan 2mg/ml


 1ml)  0.5 mg  Q4H  PRN


 IV


 For Anxiety  12/26/18 18:45


 1/2/19 18:44   


 


 


 Magnesium


 Hydroxide


  (Mom)  30 ml  BIDPRN  PRN


 ORAL


 Constipation  12/26/18 21:45


 1/25/19 21:44   


 


 


 Ondansetron HCl


  (Zofran)  4 mg  Q6H  PRN


 IVP


 Nausea & Vomiting  12/26/18 18:45


 1/25/19 18:44   


 


 


 Piperacillin Sod/


 Tazobactam Sod


 3.375 gm/Sodium


 Chloride  110 ml @ 


 27.5 mls/hr  Q8H


 IVPB


   12/26/18 20:00


 1/2/19 19:59  12/28/18 04:26


 


 


 Sodium Chloride  1,000 ml @ 


 125 mls/hr  Q8H


 IVLG


   12/26/18 19:39


 1/25/19 19:38  12/28/18 04:25


 


 


 Temazepam


  (Restoril)  7.5 mg  DAILYPRN  PRN


 ORAL


 Insomnia  12/26/18 21:45


 1/2/19 21:44   


 


 


 Vancomycin HCl


  (Vanco rx to


 dose)  1 ea  DAILY  PRN


 MISC


 Per rx protocol  12/26/18 20:00


 1/25/19 19:59   


 


 


 Vancomycin HCl 1


 gm/Dextrose  275 ml @ 


 183.708


 mls/hr  Q8H


 IVPB


   12/27/18 23:00


 1/1/19 22:59  12/28/18 06:45


 

















Shekhar Vigil MD Dec 28, 2018 07:53

## 2018-12-28 NOTE — GENERAL PROGRESS NOTE
Assessment/Plan


Problem List:  


(1) Severe sepsis


Assessment & Plan:  wbc 25, tachy with  and temp of 102 on admit


wbc still elevated, pending AM labs


CT done in the ED reviewed and showing concerns for infected hematoma


appreciate surgery eval


s/p I&D 12/26/18 on admit 


dressings c/d/i


cont abx vanco/zosyn


bxc pending


cont ivf


cont pain control IV morphine


ICD Codes:  A41.9 - Sepsis, unspecified organism; R65.20 - Severe sepsis 

without septic shock


SNOMED:  19572363


(2) Abscess of leg, right


Assessment & Plan:  cont abx per above


cx sent


s/p I&D


ICD Codes:  L02.415 - Cutaneous abscess of right lower limb; R65.20 - Severe 

sepsis without septic shock


SNOMED:  925677522


(3) Cellulitis


Assessment & Plan:  cont abx


monitor closely


ICD Codes:  L03.90 - Cellulitis, unspecified


SNOMED:  196330737


Qualifiers:  


   


(4) Lactic acid acidosis


Assessment & Plan:  due to severe sepsis


LA 2.1


recheck LA 


IVF


ICD Codes:  E87.2 - Acidosis


SNOMED:  60990378


(5) Uncontrolled diabetes mellitus


Assessment & Plan:  iss


accuchecks


hgba1c


ICD Codes:  E11.65 - Type 2 diabetes mellitus with hyperglycemia


SNOMED:  38926706, 860574477


Qualifiers:  


   Qualified Codes:  E11.65 - Type 2 diabetes mellitus with hyperglycemia


(6) Essential hypertension


Assessment & Plan:  hydralazine prn


start lisinopril 20mg qday


monitor closely


likely exacerbated due to pain


ICD Codes:  I10 - Essential (primary) hypertension


SNOMED:  73400506


(7) Uncontrolled hypertension


Assessment & Plan:  hydralazine prn


started acei


monitor closely


ICD Codes:  I10 - Essential (primary) hypertension


SNOMED:  98398652, 04823914


(8) Hyponatremia


Assessment & Plan:  Na 130 on admit


improving with fluids, now Na 133


fluids


should improved


likely due to dehydration


ICD Codes:  E87.1 - Hypo-osmolality and hyponatremia


SNOMED:  11480830


Status:  stable


Assessment/Plan


DVT Prophylaxis:   SCD, HSQ


Code Status:            Full


I have personally reviewed all labs, medications and imaging





Hospital Classification Declaration: Based on this initial evaluation, and 

depending on the patient's clinical course, I anticipate that this patient will 

require hospitalization for [2-3 midnights for severe sepsis/right leg abscess 

likely requiring surgery and close respiratory/hemodynamic monitoring.





Disposition: Once the patient is stable to leave the hospital, I anticipate the 

patient will likely be discharged to the following environment: home with  vs 

SNF





I spent 50 minutes on this patient's case, and 40 minutes were dedicated to 

counseling and/or care coordination. Discussed with patient/family, nursing 

staff, SW/CM, and consultants regarding clinical status, treatment course, and 

disposition planning.





Time of note may not reflect time of encounter.


------





Date of Discussion: 12/27/18 





A face-to-face discussion with the patient regarding the patient's advanced 

care planning took place during this hospitalization on the above date. The 

discussion included the explanation and discussion of advance directives and 

associated forms/documents, as well as the patient's current code status. We 

also discussed at length the patient's medical conditions (both acute and 

chronic), general prognosis, treatment options, and goals of care. The 

following summarizes the discussion:





Advance Care Planning/Goals of Care:


- Will attempt to fill out an AD and/or POLST with the patient prior to 

discharge, if not already completed


- Continue current evaluation and management of any acute and chronic medical 

issues


- Will continue to support the patient/family


- Will continue to discuss both short- and long-term goals of care





DPOA-HC/Surrogate Decision Maker:


None currently appointed 





Code Status:


Full Code 





AD Forms/Documents Completed:


Deferred 





A total of 31 minutes was spent on this discussion, including counseling, 

answering questions, and completing, if any, pertinent advanced care planning 

forms/documents.





Subjective


Date patient seen:  Dec 28, 2018


Time patient seen:  08:23


Allergies:  


Coded Allergies:  


     No Known Allergies (Unverified , 12/26/18)


Subjective


f/u right leg abscess/infected hematoma s/p I&D


s/p I&D on admit 12/26/18


denies any complaints, states pain is well controlled, denies any fevers or 

chills overnight


denies any abd pain, no cp or sob, no diarrhea/constipation


significant other at bedside








ROS: 14 point ROS negative except per the above subjective





Objective





Last 24 Hour Vital Signs








  Date Time  Temp Pulse Resp B/P (MAP) Pulse Ox O2 Delivery O2 Flow Rate FiO2


 


12/28/18 08:00 99.3 113 20 138/83 (101) 98   


 


12/28/18 04:00  99      


 


12/28/18 04:00 99.3 102 19 152/85 (107) 96   


 


12/28/18 00:00  105      


 


12/28/18 00:00 98.4 101 18 144/7 (52) 97   


 


12/27/18 21:48 99.1       


 


12/27/18 21:00      Room Air  


 


12/27/18 20:00  114      


 


12/27/18 20:00 99.9 117 19 153/78 (103) 95   


 


12/27/18 16:00 98.6 111 18 142/75 (97) 98   


 


12/27/18 15:36  112      


 


12/27/18 12:00 98.0 109 18 147/73 (97) 97   


 


12/27/18 11:47  104      


 


12/27/18 09:00      Room Air  


 


12/27/18 08:29    134/82    

















Intake and Output  


 


 12/27/18 12/28/18





 19:00 07:00


 


Intake Total 1000 ml 


 


Balance 1000 ml 


 


  


 


Intake Oral 1000 ml 


 


# Voids 6 1








Laboratory Tests


12/27/18 20:20: Vancomycin Level Trough 8.2


Height (Feet):  5


Height (Inches):  3.00


Weight (Pounds):  142


Objective





General Appearance:  alert, mild distress


Lines, tubes and drains:  peripheral


HEENT:  normocephalic, atraumatic


Neck:  non-tender, normal alignment, supple, normal inspection


Respiratory/Chest:  chest wall non-tender, lungs clear, normal breath sounds, 

no respiratory distress, no accessory muscle use


Cardiovascular/Chest:  regular rhythm, no gallop/murmur, tachycardia


Abdomen:  normal bowel sounds, non tender, soft, no organomegaly, no mass


Extremities:  right leg dressing c/d/i, painful to active motion as expected


Skin Exam:  other - right calf with cellulitis/erythema, blistering.


Neurologic:  CNs II-XII grossly normal, no motor/sensory deficits, alert, 

oriented x 3, responsive, normal mood/affect











Celi Kilpatrick MD Dec 28, 2018 08:24

## 2018-12-28 NOTE — NUR
NURSE NOTES:

Pt received from Ziggy RN alert and oriented x4 primarily Qatari-speaking with no complaints 
or s/s of pain, SOB, or n/v. IV site asymptomatic and patent, running to prescribed IV 
fluids. Bed in lowest position, call light and belongings within reach. Spouse (Anahy) at 
bedside. Pt's right lower extremity was off the pillow upon entering the room, educated 
client to keep leg elevated on pillow when on bed to reduce swelling, pt verbalized 
understanding.

## 2018-12-28 NOTE — NUR
NURSE NOTES:

Left message with Jiajon at message service for Dr. Federico Ballesteros reporting [phone].

## 2018-12-28 NOTE — NUR
NURSE NOTES:

Patient sitting up in bed, awake and alert, no c/o pain, no SOB, bed in lowest position, 
call light within reach.

## 2018-12-28 NOTE — NUR
RD ASSESSMENT & RECOMMENDATIONS

SEE CARE ACTIVITY FOR COMPLETE ASSESSMENT



DAILY ESTIMATED NEEDS:

Needs based on DM, sepsis, surgery 58kg adj  

25-35  kcals/kg 

2786-0974  total kcals

1.25-2  g protein/kg

  g total protein 

25-30  mL/kg

6577-3897  total fluid mLs





NUTRITION DIAGNOSIS:

1) Increased protein needs r/t sepsis, wound healing as evidenced by pt w/

critically elev WBC, s/p debridement of necrotic muscle.

2) Altered nutrition related lab values r/t diabetes as evidenced by pt

adm w/ Uglu 4+, A1C >12, elev BG ([phone]).





CURRENT DIET: CCHO MED     





PO DIET RECOMMENDATIONS:

** CCHO LOW W/ DOUBLE PROTEIN PORTIONS  **





ADDITIONAL RECOMMENDATIONS:

1) wound care: GOKUL BID + MVI x1 + VIT C 500mg BID  

2) Rec diet change to -> CCHO LOW + 2X prot portions  

3) Monitor lytes daily, replete as needed  

4) Diet edu on f/up

## 2018-12-29 VITALS — DIASTOLIC BLOOD PRESSURE: 88 MMHG | SYSTOLIC BLOOD PRESSURE: 160 MMHG

## 2018-12-29 VITALS — SYSTOLIC BLOOD PRESSURE: 141 MMHG | DIASTOLIC BLOOD PRESSURE: 78 MMHG

## 2018-12-29 VITALS — SYSTOLIC BLOOD PRESSURE: 133 MMHG | DIASTOLIC BLOOD PRESSURE: 72 MMHG

## 2018-12-29 VITALS — SYSTOLIC BLOOD PRESSURE: 146 MMHG | DIASTOLIC BLOOD PRESSURE: 81 MMHG

## 2018-12-29 VITALS — SYSTOLIC BLOOD PRESSURE: 104 MMHG | DIASTOLIC BLOOD PRESSURE: 74 MMHG

## 2018-12-29 VITALS — SYSTOLIC BLOOD PRESSURE: 147 MMHG | DIASTOLIC BLOOD PRESSURE: 90 MMHG

## 2018-12-29 LAB
ADD MANUAL DIFF: YES
ANION GAP SERPL CALC-SCNC: 11 MMOL/L (ref 5–15)
BUN SERPL-MCNC: 15 MG/DL (ref 7–18)
CALCIUM SERPL-MCNC: 7.4 MG/DL (ref 8.5–10.1)
CHLORIDE SERPL-SCNC: 97 MMOL/L (ref 98–107)
CO2 SERPL-SCNC: 22 MMOL/L (ref 21–32)
CREAT SERPL-MCNC: 0.8 MG/DL (ref 0.55–1.3)
ERYTHROCYTE [DISTWIDTH] IN BLOOD BY AUTOMATED COUNT: 11.6 % (ref 11.6–14.8)
HCT VFR BLD CALC: 31.1 % (ref 42–52)
HGB BLD-MCNC: 10.4 G/DL (ref 14.2–18)
MCV RBC AUTO: 91 FL (ref 80–99)
PLATELET # BLD: 178 K/UL (ref 150–450)
POTASSIUM SERPL-SCNC: 3.6 MMOL/L (ref 3.5–5.1)
RBC # BLD AUTO: 3.41 M/UL (ref 4.7–6.1)
SODIUM SERPL-SCNC: 130 MMOL/L (ref 136–145)
WBC # BLD AUTO: 23.8 K/UL (ref 4.8–10.8)

## 2018-12-29 RX ADMIN — VANCOMYCIN HCL-SODIUM CHLORIDE IV SOLN 1.25 GM/250ML-0.9% SCH MLS/HR: 1.25-0.9/25 SOLUTION at 14:27

## 2018-12-29 RX ADMIN — INSULIN ASPART SCH UNITS: 100 INJECTION, SOLUTION INTRAVENOUS; SUBCUTANEOUS at 06:14

## 2018-12-29 RX ADMIN — INSULIN ASPART SCH UNITS: 100 INJECTION, SOLUTION INTRAVENOUS; SUBCUTANEOUS at 12:03

## 2018-12-29 RX ADMIN — LISINOPRIL SCH MG: 20 TABLET ORAL at 09:00

## 2018-12-29 RX ADMIN — INSULIN ASPART SCH UNITS: 100 INJECTION, SOLUTION INTRAVENOUS; SUBCUTANEOUS at 20:59

## 2018-12-29 RX ADMIN — DOCUSATE SODIUM SCH MG: 100 CAPSULE, LIQUID FILLED ORAL at 09:05

## 2018-12-29 RX ADMIN — VANCOMYCIN HCL-SODIUM CHLORIDE IV SOLN 1.25 GM/250ML-0.9% SCH MLS/HR: 1.25-0.9/25 SOLUTION at 07:50

## 2018-12-29 RX ADMIN — DOCUSATE SODIUM SCH MG: 100 CAPSULE, LIQUID FILLED ORAL at 20:55

## 2018-12-29 RX ADMIN — INSULIN ASPART SCH UNITS: 100 INJECTION, SOLUTION INTRAVENOUS; SUBCUTANEOUS at 16:43

## 2018-12-29 RX ADMIN — DEXTROSE MONOHYDRATE SCH MLS/HR: 50 INJECTION, SOLUTION INTRAVENOUS at 12:00

## 2018-12-29 RX ADMIN — VANCOMYCIN HCL-SODIUM CHLORIDE IV SOLN 1.25 GM/250ML-0.9% SCH MLS/HR: 1.25-0.9/25 SOLUTION at 22:52

## 2018-12-29 RX ADMIN — HEPARIN SODIUM SCH UNITS: 5000 INJECTION INTRAVENOUS; SUBCUTANEOUS at 06:00

## 2018-12-29 RX ADMIN — DEXTROSE MONOHYDRATE SCH MLS/HR: 50 INJECTION, SOLUTION INTRAVENOUS at 04:53

## 2018-12-29 NOTE — INFECTIOUS DISEASES PROG NOTE
Assessment/Plan


Assessment/Plan








Assessment:








Sepsis 2ry infected R leg/ infected hematoma/ abscess/necrotic myositis


   -12/26 SP I+D


             --OR findings: there was a large foul-smelling abscess evacuated.  

The abscess was evacuated and fascia was identified with edema but no necrosis.

  The fascia was incised and the muscle was identified.  There was some areas 

of muscle necrosis, which required debridement. The remainder of the extremity 

was significantly edematous, but without other identifiable abscess.


   -CT leg: Fluid collection in the soft tissues of the right lateral upper 

calf which may represent evolution of a prior intramuscular hematoma, 

especially given history of remote trauma. There is concern for superimposed 

infection/abscess, especially given skin thickening and subcutaneous stranding 

suggestive of a cellulitis. Correlate clinically.





 - 12/26 OR Cx - MRSA 


 


Fever, improving


Leukocytosis, improving





DM





Plan:


-Continue empiric IV Vancomycin d# 3  and DC  Zosyn d# 3 


-f/u cx


-Monitor CBC/CMP, temperatures


-wound care


-Sx f.u





Subjective


Allergies:  


Coded Allergies:  


     No Known Allergies (Unverified , 12/26/18)


Subjective


comfortable





Objective


Vital Signs





Last 24 Hour Vital Signs








  Date Time  Temp Pulse Resp B/P (MAP) Pulse Ox O2 Delivery O2 Flow Rate FiO2


 


12/29/18 09:00    104/74    


 


12/29/18 08:20 98.6       


 


12/29/18 08:00 98.6 114 14 104/74 (84) 97   


 


12/29/18 08:00  113      


 


12/29/18 06:40 98.5       


 


12/29/18 06:26      Room Air  


 


12/29/18 04:30 98.5       


 


12/29/18 04:00 101.7 109 18 133/72 (92) 98   


 


12/29/18 04:00  107      


 


12/29/18 00:00 99.2 98 18 146/81 (102) 99   


 


12/29/18 00:00  95      


 


12/28/18 21:00      Room Air  


 


12/28/18 20:00  94      


 


12/28/18 20:00 99.0 95 18 128/75 (92) 99   


 


12/28/18 17:26 99.9       


 


12/28/18 16:00 102.0 115 20 138/78 (98) 97   


 


12/28/18 16:00  115      


 


12/28/18 15:30 102.6       








Height (Feet):  5


Height (Inches):  3.00


Weight (Pounds):  142


HEENT:  anicteric


Respiratory/Chest:  normal breath sounds


Cardiovascular:  regular rhythm


Abdomen:  no organomegaly





Microbiology








 Date/Time


Source Procedure


Growth Status


 


 


 12/26/18 13:35


Blood Blood Culture - Preliminary


NO GROWTH AFTER 48 HOURS Resulted


 


 12/26/18 13:20


Blood Blood Culture - Preliminary


NO GROWTH AFTER 48 HOURS Resulted





 12/28/18 07:30


Wound Gram Stain - Final Resulted


 


 12/28/18 07:30 Aerobic Culture - Preliminary


Staphylococcus Aureus Resulted





 12/26/18 20:40


Other Gram Stain - Final Complete


 


 12/26/18 20:40 Aerobic Culture - Final


Staphylococcus Aureus - Mrsa Complete


 


 12/26/18 20:40


Wound Anaerobic Culture - Preliminary Resulted











Laboratory Tests








Test


  12/28/18


21:45 12/29/18


05:00


 


Vancomycin Level Trough


  12.1 ug/mL


(5.0-12.0)  H 


 


 


White Blood Count


  


  23.8 K/UL


(4.8-10.8)  *H


 


Red Blood Count


  


  3.41 M/UL


(4.70-6.10)  L


 


Hemoglobin


  


  10.4 G/DL


(14.2-18.0)  L


 


Hematocrit


  


  31.1 %


(42.0-52.0)  L


 


Mean Corpuscular Volume  91 FL (80-99)  


 


Mean Corpuscular Hemoglobin


  


  30.6 PG


(27.0-31.0)


 


Mean Corpuscular Hemoglobin


Concent 


  33.5 G/DL


(32.0-36.0)


 


Red Cell Distribution Width


  


  11.6 %


(11.6-14.8)


 


Platelet Count


  


  178 K/UL


(150-450)


 


Mean Platelet Volume


  


  6.9 FL


(6.5-10.1)


 


Neutrophils (%) (Auto)


  


  % (45.0-75.0)


 


 


Lymphocytes (%) (Auto)


  


  % (20.0-45.0)


 


 


Monocytes (%) (Auto)   % (1.0-10.0)  


 


Eosinophils (%) (Auto)   % (0.0-3.0)  


 


Basophils (%) (Auto)   % (0.0-2.0)  


 


Differential Total Cells


Counted 


  100  


 


 


Neutrophils % (Manual)  88 % (45-75)  H


 


Lymphocytes % (Manual)  4 % (20-45)  L


 


Monocytes % (Manual)  4 % (1-10)  


 


Eosinophils % (Manual)  1 % (0-3)  


 


Basophils % (Manual)  0 % (0-2)  


 


Band Neutrophils  3 % (0-8)  


 


Platelet Estimate  Adequate  


 


Platelet Morphology  Normal  


 


Red Blood Cell Morphology  Normal  


 


Sodium Level


  


  130 MMOL/L


(136-145)  L


 


Potassium Level


  


  3.6 MMOL/L


(3.5-5.1)


 


Chloride Level


  


  97 MMOL/L


()  L


 


Carbon Dioxide Level


  


  22 MMOL/L


(21-32)


 


Anion Gap


  


  11 mmol/L


(5-15)


 


Blood Urea Nitrogen


  


  15 mg/dL


(7-18)


 


Creatinine


  


  0.8 MG/DL


(0.55-1.30)


 


Estimat Glomerular Filtration


Rate 


  > 60 mL/min


(>60)


 


Glucose Level


  


  347 MG/DL


()  H


 


Calcium Level


  


  7.4 MG/DL


(8.5-10.1)  L











Current Medications








 Medications


  (Trade)  Dose


 Ordered  Sig/Shae


 Route


 PRN Reason  Start Time


 Stop Time Status Last Admin


Dose Admin


 


 Acetaminophen


  (Tylenol)  650 mg  Q4H  PRN


 ORAL


 Mild Pain/Temp > 100.5  12/27/18 20:45


 1/26/19 20:44  12/29/18 06:10


 


 


 Al Hydroxide/Mg


 Hydroxide


  (Mylanta II)  30 ml  Q6H  PRN


 ORAL


 dyspepsia  12/26/18 18:45


 1/25/19 18:44   


 


 


 Dextrose


  (Dextrose 50%)  25 ml  Q30M  PRN


 IV


 Hypoglycemia  12/26/18 20:00


 1/25/19 19:59   


 


 


 Dextrose


  (Dextrose 50%)  50 ml  Q30M  PRN


 IV


 Hypoglycemia  12/26/18 20:00


 1/25/19 19:59   


 


 


 Diphenhydramine


 HCl


  (Benadryl)  12.5 mg  Q6H  PRN


 IVP


 Itching/Pruritis  12/26/18 21:45


 1/25/19 21:44   


 


 


 Docusate Sodium


  (Colace)  100 mg  EVERY 12  HOURS


 ORAL


   12/26/18 21:00


 1/25/19 20:59  12/29/18 09:05


 


 


 Famotidine


  (Pepcid)  40 mg  DAILY


 ORAL


   12/27/18 09:00


 1/26/19 08:59  12/29/18 09:05


 


 


 Hydralazine HCl


  (Apresoline)  10 mg  Q6H  PRN


 ORAL


 hypertension  12/26/18 20:00


 1/25/19 19:59   


 


 


 Hydromorphone HCl


  (Dilaudid)  0.5 mg  Q3H  PRN


 IVP


 For Pain  12/26/18 18:45


 1/2/19 18:44   


 


 


 Hydromorphone HCl


  (Dilaudid)  1 mg  Q3H  PRN


 IVP


 Moderate Pain (Pain Scale 4-6)  12/26/18 18:45


 1/2/19 18:44  12/28/18 20:47


 


 


 Hydromorphone HCl


  (Dilaudid)  2 mg  Q3H  PRN


 IVP


 Severe Pain (Pain Scale 7-10)  12/26/18 18:45


 1/2/19 18:44  12/29/18 07:50


 


 


 Insulin Aspart


  (NovoLOG)    BEFORE MEALS AND  HS


 SUBQ


   12/26/18 21:00


 1/25/19 20:59  12/29/18 12:03


 


 


 Insulin Detemir


  (Levemir)  10 units  QHS


 SUBQ


   12/28/18 21:00


 1/27/19 20:59  12/28/18 20:50


 


 


 Lisinopril


  (Prinivil)  20 mg  DAILY


 ORAL


   12/27/18 09:00


 1/26/19 08:59  12/28/18 08:55


 


 


 Lorazepam


  (Ativan 2mg/ml


 1ml)  0.5 mg  Q4H  PRN


 IV


 For Anxiety  12/26/18 18:45


 1/2/19 18:44   


 


 


 Magnesium


 Hydroxide


  (Mom)  30 ml  BIDPRN  PRN


 ORAL


 Constipation  12/26/18 21:45


 1/25/19 21:44   


 


 


 Ondansetron HCl


  (Zofran)  4 mg  Q6H  PRN


 IVP


 Nausea & Vomiting  12/26/18 18:45


 1/25/19 18:44   


 


 


 Piperacillin Sod/


 Tazobactam Sod


 3.375 gm/Sodium


 Chloride  110 ml @ 


 27.5 mls/hr  Q8H


 IVPB


   12/26/18 20:00


 1/2/19 19:59  12/29/18 12:00


 


 


 Sodium Chloride  1,000 ml @ 


 125 mls/hr  Q8H


 IV


   12/28/18 16:44


 1/27/19 16:43  12/29/18 06:09


 


 


 Temazepam


  (Restoril)  7.5 mg  DAILYPRN  PRN


 ORAL


 Insomnia  12/26/18 21:45


 1/2/19 21:44   


 


 


 Vancomycin HCl


  (Vanco rx to


 dose)  1 ea  DAILY  PRN


 MISC


 Per rx protocol  12/26/18 20:00


 1/25/19 19:59   


 


 


 Vancomycin HCl/


 Dextrose  250 ml @ 


 167 mls/hr  Q8H


 IVPB


   12/28/18 23:00


 1/2/19 22:59  12/29/18 07:50


 

















Ciro Zaman MD Dec 29, 2018 13:07

## 2018-12-29 NOTE — GENERAL PROGRESS NOTE
Progress Note


Progress Note


Surgery:





leukocytosis slowly improving


doing well


edema improved


feels better


wound saturated


mild oozing from muscle





hold heparin


change dressings prn saturation


will monitor


cont abx


trend labs











Tony Fox Dec 29, 2018 12:31

## 2018-12-29 NOTE — NUR
NURSE NOTES:

Pt's BS was at 447, RN gave 12 U of insulin and informed Dr. Kilpatrick. At 0600, pt's 
temperature was at 101.7, RN administered Tylenol PRN and temp lowered to 98.5, informed Dr. Shonna PFEIFFER

## 2018-12-29 NOTE — GENERAL PROGRESS NOTE
Assessment/Plan


Status:  stable


Assessment/Plan


# Leukocytosis. Secondary to underlying infection, sepsis, cellulitis. 


--> Peripheral has been ordered, results are pending 


--> Medications have been reviewed 


--> Imaging has been reviewed. CXR shows no acute disease. 


--> Blood cultures and urine cultures prn


--> has been started on abx, empiric treatment 


# Anemia of chronic disease due to underlying chronic medical issues, 

multifactorial. 


--> Current Hgb >11, no w/u required at this time. 


--> Monitor for stability 


# Sepsis. ID is follwoing, appreciate recs. 


--> On abx. 


--> IVF 


# Abscess of R leg. Surgery is following, appreciate recs. 


--> S/P I&D


--> abx 


--> CT done in the ED reviewed and showing concerns for infected hematoma


# Cellulitis


# Lactic acid acidosis. Due to severe sepsis. 


# DM


# HTN. 


# DVT prophylaxis. 








GREATLY APPRECIATE CONSULTATION. 





Date and time note is entered does not necessarily reflect the time of 

encounter.





Subjective


Date patient seen:  Dec 29, 2018


Hematologic/Lymphatic:  Reports: anemia


Allergies:  


Coded Allergies:  


     No Known Allergies (Unverified , 12/26/18)


All Systems:  reviewed and negative except above


Subjective


12/29: Pt awake and alert. No acute events. WBC remains elevated, remains on 

abx. Afebrile. H/H stable.





Objective





Last 24 Hour Vital Signs








  Date Time  Temp Pulse Resp B/P (MAP) Pulse Ox O2 Delivery O2 Flow Rate FiO2


 


12/29/18 12:00  105      


 


12/29/18 12:00 99.5 114 16 160/88 (112) 98   


 


12/29/18 09:00    104/74    


 


12/29/18 08:20 98.6       


 


12/29/18 08:00 98.6 114 14 104/74 (84) 97   


 


12/29/18 08:00  113      


 


12/29/18 06:40 98.5       


 


12/29/18 06:26      Room Air  


 


12/29/18 04:30 98.5       


 


12/29/18 04:00 101.7 109 18 133/72 (92) 98   


 


12/29/18 04:00  107      


 


12/29/18 00:00 99.2 98 18 146/81 (102) 99   


 


12/29/18 00:00  95      


 


12/28/18 21:00      Room Air  


 


12/28/18 20:00  94      


 


12/28/18 20:00 99.0 95 18 128/75 (92) 99   


 


12/28/18 17:26 99.9       


 


12/28/18 16:00 102.0 115 20 138/78 (98) 97   


 


12/28/18 16:00  115      


 


12/28/18 15:30 102.6       

















Intake and Output  


 


 12/28/18 12/29/18





 19:00 07:00


 


Intake Total 2240.0 ml 1499.0 ml


 


Output Total  500 ml


 


Balance 2240.0 ml 999.0 ml


 


  


 


Intake Oral 720 ml 250 ml


 


IV Total 1520.0 ml 1249.0 ml


 


Output Urine Total  500 ml


 


# Voids 3 4


 


# Bowel Movements 2 3








Laboratory Tests


12/28/18 21:45: Vancomycin Level Trough 12.1H


12/29/18 05:00: 


White Blood Count 23.8*H, Red Blood Count 3.41L, Hemoglobin 10.4L, Hematocrit 

31.1L, Mean Corpuscular Volume 91, Mean Corpuscular Hemoglobin 30.6, Mean 

Corpuscular Hemoglobin Concent 33.5, Red Cell Distribution Width 11.6, Platelet 

Count 178, Mean Platelet Volume 6.9, Neutrophils (%) (Auto) , Lymphocytes (%) (

Auto) , Monocytes (%) (Auto) , Eosinophils (%) (Auto) , Basophils (%) (Auto) , 

Differential Total Cells Counted 100, Neutrophils % (Manual) 88H, Lymphocytes % 

(Manual) 4L, Monocytes % (Manual) 4, Eosinophils % (Manual) 1, Basophils % (

Manual) 0, Band Neutrophils 3, Platelet Estimate Adequate, Platelet Morphology 

Normal, Red Blood Cell Morphology Normal, Sodium Level 130L, Potassium Level 3.6

, Chloride Level 97L, Carbon Dioxide Level 22, Anion Gap 11, Blood Urea 

Nitrogen 15, Creatinine 0.8, Estimat Glomerular Filtration Rate > 60, Glucose 

Level 347H, Calcium Level 7.4L


Height (Feet):  5


Height (Inches):  3.00


Weight (Pounds):  142


Objective


General Appearance:  alert, no acute distress


Vitals:  Have been reviewed. 


Lines, tubes and drains:  peripheral


HEENT:  normocephalic, atraumatic


Neck:  non-tender, normal alignment, supple, normal inspection


Respiratory/Chest:  chest wall non-tender, lungs clear, normal breath sounds, 

no respiratory distress, no accessory muscle use


Cardiovascular/Chest:  regular rhythm, no gallop/murmur, tachycardia


Abdomen:  normal bowel sounds, non tender, soft, no organomegaly, no mass


Extremities:  other - right calf TTP+, painful to active motion


Skin Exam:  other - right calf with cellulitis/erythema, blistering.


Neurologic:  CNs II-XII grossly normal, no motor/sensory deficits, alert, 

oriented x 3, responsive, normal mood/affect











Quintin Yeh MD Dec 29, 2018 14:37

## 2018-12-29 NOTE — GENERAL PROGRESS NOTE
Assessment/Plan


Assessment/Plan


Severe sepsis due to RLE MRSA abscess


Remains febrile with leukocytosis


s/p I&D 12/26/18 by surgery


Wound culture growing MRSA


continue local wound care


cont IV abx vanco, Zosyn stopped


cont pain control IV morphine





Uncontrolled type diabetes, patient not on insulin at home


increase levemir to 12 units


increase sliding scale to high dose coverage


change to diet carb controlled 





Uncontrolled HTN, variable control


continue lisinopril 20mg qday





PseudoHyponatremia due to hyperglycemia


continue to monitor daily BMP





Lactic acid acidosis, resolved





Subjective


Date patient seen:  Dec 29, 2018


Time patient seen:  15:15


ROS Limited/Unobtainable:  No


Constitutional:  Reports: chills, fever


HEENT:  Denies: eye pain


Cardiovascular:  Denies: chest pain


Respiratory:  Denies: cough


Gastrointestinal/Abdominal:  Denies: abdomen distended, abdominal pain


Genitourinary:  Denies: burning


Neurologic/Psychiatric:  Denies: depressed


Endocrine:  Denies: excessive sweating


Hematologic/Lymphatic:  Denies: anemia


Allergies:  


Coded Allergies:  


     No Known Allergies (Unverified , 12/26/18)


Subjective


Medicine followup for severe sepsis, LE abscess, uncontrolled DM. Remains 

intermittently febrile with glucose levels over 400.





Objective





Last 24 Hour Vital Signs








  Date Time  Temp Pulse Resp B/P (MAP) Pulse Ox O2 Delivery O2 Flow Rate FiO2


 


12/29/18 16:28 99.2       


 


12/29/18 16:00  120      


 


12/29/18 16:00 102.5 123 18 147/90 (109) 97   


 


12/29/18 15:13 99.5       


 


12/29/18 12:00  105      


 


12/29/18 12:00 99.5 114 16 160/88 (112) 98   


 


12/29/18 09:00    104/74    


 


12/29/18 08:00 98.6 114 14 104/74 (84) 97   


 


12/29/18 08:00  113      


 


12/29/18 06:26      Room Air  


 


12/29/18 04:30 98.5       


 


12/29/18 04:00 101.7 109 18 133/72 (92) 98   


 


12/29/18 04:00  107      


 


12/29/18 00:00 99.2 98 18 146/81 (102) 99   


 


12/29/18 00:00  95      


 


12/28/18 21:00      Room Air  


 


12/28/18 20:00  94      


 


12/28/18 20:00 99.0 95 18 128/75 (92) 99   


 


12/28/18 17:26 99.9       

















Intake and Output  


 


 12/28/18 12/29/18





 19:00 07:00


 


Intake Total 2240.0 ml 1499.0 ml


 


Output Total  500 ml


 


Balance 2240.0 ml 999.0 ml


 


  


 


Intake Oral 720 ml 250 ml


 


IV Total 1520.0 ml 1249.0 ml


 


Output Urine Total  500 ml


 


# Voids 3 4


 


# Bowel Movements 2 3








Laboratory Tests


12/28/18 21:45: Vancomycin Level Trough 12.1H


12/29/18 05:00: 


White Blood Count 23.8*H, Red Blood Count 3.41L, Hemoglobin 10.4L, Hematocrit 

31.1L, Mean Corpuscular Volume 91, Mean Corpuscular Hemoglobin 30.6, Mean 

Corpuscular Hemoglobin Concent 33.5, Red Cell Distribution Width 11.6, Platelet 

Count 178, Mean Platelet Volume 6.9, Neutrophils (%) (Auto) , Lymphocytes (%) (

Auto) , Monocytes (%) (Auto) , Eosinophils (%) (Auto) , Basophils (%) (Auto) , 

Differential Total Cells Counted 100, Neutrophils % (Manual) 88H, Lymphocytes % 

(Manual) 4L, Monocytes % (Manual) 4, Eosinophils % (Manual) 1, Basophils % (

Manual) 0, Band Neutrophils 3, Platelet Estimate Adequate, Platelet Morphology 

Normal, Red Blood Cell Morphology Normal, Sodium Level 130L, Potassium Level 3.6

, Chloride Level 97L, Carbon Dioxide Level 22, Anion Gap 11, Blood Urea 

Nitrogen 15, Creatinine 0.8, Estimat Glomerular Filtration Rate > 60, Glucose 

Level 347H, Calcium Level 7.4L


Height (Feet):  5


Height (Inches):  3.00


Weight (Pounds):  142


General Appearance:  no apparent distress, alert


EENT:  PERRL/EOMI, normal ENT inspection


Neck:  normal alignment, supple


Cardiovascular:  normal peripheral pulses, normal rate


Respiratory/Chest:  chest wall non-tender, lungs clear


Abdomen:  normal bowel sounds, non tender


Extremities:  other - RLE in dressing


Neurologic:  CNs II-XII grossly normal, no motor/sensory deficits, alert, 

oriented x 3











Yomi Vazquez MD Dec 29, 2018 17:37

## 2018-12-29 NOTE — NUR
HAND-OFF: 

Report given to Stephani Benitez RN.  Patient sitting up in bed, watching television, 
two children at bedside, bed in lowest position, call light within reach, in no apparent 
distress, no c/o pain, no SOB.

## 2018-12-29 NOTE — NUR
NURSE NOTES:

Notified Dr. Celi Kilpatrick that blood sugar=445 and covered with 12 units of insulin.  Dr. Celi Kilpatrick said that Dr. Ramirez will be in and ordered diet changed to CCHO low.

## 2018-12-30 VITALS — SYSTOLIC BLOOD PRESSURE: 150 MMHG | DIASTOLIC BLOOD PRESSURE: 80 MMHG

## 2018-12-30 VITALS — DIASTOLIC BLOOD PRESSURE: 71 MMHG | SYSTOLIC BLOOD PRESSURE: 149 MMHG

## 2018-12-30 VITALS — DIASTOLIC BLOOD PRESSURE: 65 MMHG | SYSTOLIC BLOOD PRESSURE: 129 MMHG

## 2018-12-30 VITALS — SYSTOLIC BLOOD PRESSURE: 142 MMHG | DIASTOLIC BLOOD PRESSURE: 72 MMHG

## 2018-12-30 VITALS — DIASTOLIC BLOOD PRESSURE: 72 MMHG | SYSTOLIC BLOOD PRESSURE: 138 MMHG

## 2018-12-30 VITALS — SYSTOLIC BLOOD PRESSURE: 146 MMHG | DIASTOLIC BLOOD PRESSURE: 86 MMHG

## 2018-12-30 LAB
% IRON SATURATION: 24 % (ref 15–50)
ADD MANUAL DIFF: YES
ANION GAP SERPL CALC-SCNC: 5 MMOL/L (ref 5–15)
BUN SERPL-MCNC: 16 MG/DL (ref 7–18)
CALCIUM SERPL-MCNC: 7.5 MG/DL (ref 8.5–10.1)
CHLORIDE SERPL-SCNC: 100 MMOL/L (ref 98–107)
CO2 SERPL-SCNC: 26 MMOL/L (ref 21–32)
CREAT SERPL-MCNC: 0.9 MG/DL (ref 0.55–1.3)
ERYTHROCYTE [DISTWIDTH] IN BLOOD BY AUTOMATED COUNT: 11.3 % (ref 11.6–14.8)
FERRITIN SERPL-MCNC: 803 NG/ML (ref 8–388)
HCT VFR BLD CALC: 28.6 % (ref 42–52)
HGB BLD-MCNC: 9.8 G/DL (ref 14.2–18)
INR PPP: 1.1 (ref 0.9–1.1)
IRON SERPL-MCNC: 21 UG/DL (ref 50–175)
LDH SERPL L TO P-CCNC: 272 U/L (ref 81–234)
MCV RBC AUTO: 90 FL (ref 80–99)
PLATELET # BLD: 193 K/UL (ref 150–450)
POTASSIUM SERPL-SCNC: 3.1 MMOL/L (ref 3.5–5.1)
RBC # BLD AUTO: 3.17 M/UL (ref 4.7–6.1)
SODIUM SERPL-SCNC: 131 MMOL/L (ref 136–145)
TIBC SERPL-MCNC: 87 UG/DL (ref 250–450)
UNSATURATED IRON BINDING: 66 UG/DL (ref 112–346)
WBC # BLD AUTO: 19.4 K/UL (ref 4.8–10.8)

## 2018-12-30 RX ADMIN — DOCUSATE SODIUM SCH MG: 100 CAPSULE, LIQUID FILLED ORAL at 08:41

## 2018-12-30 RX ADMIN — DOCUSATE SODIUM SCH MG: 100 CAPSULE, LIQUID FILLED ORAL at 21:16

## 2018-12-30 RX ADMIN — VANCOMYCIN HCL-SODIUM CHLORIDE IV SOLN 1.25 GM/250ML-0.9% SCH MLS/HR: 1.25-0.9/25 SOLUTION at 23:08

## 2018-12-30 RX ADMIN — INSULIN ASPART SCH UNITS: 100 INJECTION, SOLUTION INTRAVENOUS; SUBCUTANEOUS at 11:58

## 2018-12-30 RX ADMIN — INSULIN ASPART SCH UNITS: 100 INJECTION, SOLUTION INTRAVENOUS; SUBCUTANEOUS at 16:35

## 2018-12-30 RX ADMIN — INSULIN ASPART SCH UNITS: 100 INJECTION, SOLUTION INTRAVENOUS; SUBCUTANEOUS at 21:24

## 2018-12-30 RX ADMIN — LISINOPRIL SCH MG: 20 TABLET ORAL at 08:40

## 2018-12-30 RX ADMIN — DIPHENHYDRAMINE HYDROCHLORIDE PRN MG: 50 INJECTION INTRAMUSCULAR; INTRAVENOUS at 17:54

## 2018-12-30 RX ADMIN — DIPHENHYDRAMINE HYDROCHLORIDE PRN MG: 50 INJECTION INTRAMUSCULAR; INTRAVENOUS at 13:49

## 2018-12-30 RX ADMIN — VANCOMYCIN HCL-SODIUM CHLORIDE IV SOLN 1.25 GM/250ML-0.9% SCH MLS/HR: 1.25-0.9/25 SOLUTION at 15:04

## 2018-12-30 RX ADMIN — INSULIN ASPART SCH UNITS: 100 INJECTION, SOLUTION INTRAVENOUS; SUBCUTANEOUS at 06:09

## 2018-12-30 RX ADMIN — VANCOMYCIN HCL-SODIUM CHLORIDE IV SOLN 1.25 GM/250ML-0.9% SCH MLS/HR: 1.25-0.9/25 SOLUTION at 06:11

## 2018-12-30 RX ADMIN — DIPHENHYDRAMINE HYDROCHLORIDE PRN MG: 50 INJECTION INTRAMUSCULAR; INTRAVENOUS at 03:04

## 2018-12-30 NOTE — NUR
NURSE NOTES:



Noted drainage in his wound. Dr. Fox will see the patient in AM and instructed not to 
change the dressing for hemostasis. Since patient moves and ambulated with walker to the 
restroom, he is very concerned about the drainage, RN explained regarding MD orders and just 
reinforced with 4x4, abdominal pads and wrapped with kerlix over the dressing. Patient is 
satisfied and calm.

## 2018-12-30 NOTE — NUR
NURSE NOTES: Pt awake/alert in bed, breathing easily on room air, denies SOB and denies pain 
at this time. Vital signs stable with SR @ 104 on monitor. IV access left hand with NS 
running at 125 ml/hr. Wife at bedside. Pt asked for bandage to be loosened but informed any 
looser and it will fall off. Pt has walker at bedside. Bed left in low position, side rails 
up x 2 and call light left near pt's hand.

## 2018-12-30 NOTE — NUR
NURSE NOTES:

received patient report from natali garcia. patient is on bed awake. no acute distress noted. 
denies pain. aox4. will continue to monitor.

## 2018-12-30 NOTE — GENERAL PROGRESS NOTE
Assessment/Plan


Assessment/Plan


Severe sepsis due to RLE MRSA abscess


Wound culture growing MRSA


Fever and leukocytosis improved


s/p I&D 12/26/18 by surgery


continue local wound care


cont IV abx vanco


cont pain control IV morphine





Uncontrolled type diabetes, patient not on insulin at home, better control today


increase levemir to 14 units


continue high dose ISS


carb controlled diet





Uncontrolled HTN, variable control


continue lisinopril 20mg qday





PseudoHyponatremia due to hyperglycemia


continue to monitor daily BMP





Hypokalemia


replace with oral KCl


Check BMP in AM





Lactic acid acidosis, resolved





I spent 45 minutes on this patient's case, and 24 minutes was dedicated to 

counseling and/or care coordination.





Subjective


Date patient seen:  Dec 30, 2018


Time patient seen:  08:00


Constitutional:  Denies: chills, fever


Cardiovascular:  Denies: chest pain


Respiratory:  Denies: cough


Gastrointestinal/Abdominal:  Denies: abdomen distended, abdominal pain


Genitourinary:  Denies: burning


Neurologic/Psychiatric:  Denies: anxiety


Endocrine:  Denies: excessive sweating


Hematologic/Lymphatic:  Denies: anemia


Allergies:  


Coded Allergies:  


     No Known Allergies (Unverified , 12/26/18)


Subjective


Medicine followup for severe sepsis, LE abscess, uncontrolled DM. Fevers and 

leukocytosis improved. Glycemic control also improved but still not optimal





Objective





Last 24 Hour Vital Signs








  Date Time  Temp Pulse Resp B/P (MAP) Pulse Ox O2 Delivery O2 Flow Rate FiO2


 


12/30/18 09:42 98.6       


 


12/30/18 09:00      Room Air  


 


12/30/18 08:40    141/78    


 


12/30/18 08:00 100.0 101 20 150/80 (103) 98   


 


12/30/18 08:00  100      


 


12/30/18 04:00 98.2 89 18 138/72 (94) 98   


 


12/30/18 03:38  93      


 


12/30/18 00:00 101.8 97 18 146/86 (106) 97   


 


12/29/18 23:44  97      


 


12/29/18 21:00      Room Air  


 


12/29/18 20:00 98.6 100 18 141/78 (99) 97   


 


12/29/18 19:45  104      


 


12/29/18 16:00  120      


 


12/29/18 16:00 102.5 123 18 147/90 (109) 97   


 


12/29/18 15:13 99.5       


 


12/29/18 12:00  105      


 


12/29/18 12:00 99.5 114 16 160/88 (112) 98   

















Intake and Output  


 


 12/29/18 12/30/18





 19:00 07:00


 


Intake Total 2220.0 ml 687.5 ml


 


Output Total  400 ml


 


Balance 2220.0 ml 287.5 ml


 


  


 


Intake Oral 600 ml 


 


IV Total 1620.0 ml 687.5 ml


 


Output Urine Total  400 ml


 


# Voids 4 


 


# Bowel Movements  1








Laboratory Tests


12/29/18 21:30: Vancomycin Level Trough 20.0H


12/30/18 07:08: 


White Blood Count 19.4H, Red Blood Count 3.17L, Hemoglobin 9.8L, Hematocrit 

28.6L, Mean Corpuscular Volume 90, Mean Corpuscular Hemoglobin 30.8, Mean 

Corpuscular Hemoglobin Concent 34.2, Red Cell Distribution Width 11.3L, 

Platelet Count 193, Mean Platelet Volume 7.3, Neutrophils (%) (Auto) , 

Lymphocytes (%) (Auto) , Monocytes (%) (Auto) , Eosinophils (%) (Auto) , 

Basophils (%) (Auto) , Differential Total Cells Counted 100, Neutrophils % (

Manual) 82H, Lymphocytes % (Manual) 6L, Monocytes % (Manual) 5, Eosinophils % (

Manual) 1, Basophils % (Manual) 0, Band Neutrophils 6, Platelet Estimate 

Adequate, Platelet Morphology Normal, Hypochromasia 1+, Sodium Level 131L, 

Potassium Level 3.1L, Chloride Level 100, Carbon Dioxide Level 26, Anion Gap 5, 

Blood Urea Nitrogen 16, Creatinine 0.9, Estimat Glomerular Filtration Rate > 60

, Glucose Level 281H, Calcium Level 7.5L


Height (Feet):  5


Height (Inches):  3.00


Weight (Pounds):  142


General Appearance:  no apparent distress, alert


EENT:  normal ENT inspection


Neck:  non-tender


Cardiovascular:  normal rate, regular rhythm


Respiratory/Chest:  lungs clear, normal breath sounds


Abdomen:  normal bowel sounds, non tender











Yomi Vazquez MD Dec 30, 2018 12:01

## 2018-12-30 NOTE — GENERAL PROGRESS NOTE
Progress Note


Progress Note


leukocytosis improving


edema much improved today


dressings not saturated





diet as tolerated


change dressings qshift and prn saturation


as improves will plan to transition to oral abx and d/c home











Tony Fox Dec 30, 2018 17:40

## 2018-12-30 NOTE — GENERAL PROGRESS NOTE
Assessment/Plan


Assessment/Plan


# Leukocytosis. Secondary to underlying infection, sepsis, cellulitis. 


--> Peripheral has been ordered, results are pending 


--> Medications have been reviewed 


--> Imaging has been reviewed. CXR shows no acute disease. 


--> Blood cultures and urine cultures prn


--> has been started on abx, empiric treatment 


# Anemia of chronic disease due to underlying chronic medical issues, 

multifactorial. 


--> Current Hgb decreased, obtain w/u


--> PENDING w/u


--> Monitor for stability 


# Sepsis. ID is follwoing, appreciate recs. 


--> On abx. 


--> IVF 


# Abscess of R leg. Surgery is following, appreciate recs. 


--> S/P I&D


--> abx 


--> CT done in the ED reviewed and showing concerns for infected hematoma


# Cellulitis


# Lactic acid acidosis. Due to severe sepsis. 


# DM


# HTN. 


# DVT prophylaxis. 








GREATLY APPRECIATE CONSULTATION. 





Date and time note is entered does not necessarily reflect the time of 

encounter.





Subjective


Constitutional:  Denies: no symptoms, chills, diaphoresis, fever, malaise, 

weakness, other


HEENT:  Denies: no symptoms, eye pain, blurred vision, tearing, double vision, 

ear pain, ear discharge, nose pain, nose congestion, throat pain, throat 

swelling, mouth pain, mouth swelling, other


Cardiovascular:  Denies: no symptoms, chest pain, edema, irregular heart rate, 

lightheadedness, palpitations, syncope, other


Respiratory:  Denies: no symptoms, cough, orthopnea, shortness of breath, SOB 

with excertion, SOB at rest, sputum, stridor, wheezing, other


Gastrointestinal/Abdominal:  Denies: no symptoms, abdomen distended, abdominal 

pain, black stools, tarry stools, blood in stool, constipated, diarrhea, 

difficulty swallowing, nausea, poor appetite, poor fluid intake, rectal bleeding

, vomiting, other


Genitourinary:  Denies: no symptoms, burning, discharge, frequency, flank pain, 

hematuria, incontinence, pain, urgency, other


Neurologic/Psychiatric:  Denies: no symptoms, anxiety, depressed, emotional 

problems, headache, numbness, paresthesia, pre-existing deficit, seizure, 

tingling, tremors, weakness, other


Endocrine:  Denies: no symptoms, excessive sweating, flushing, intolerance to 

cold, intolerance to heat, increased hunger, increased thirst, increased urine, 

unexplained weight gain, unexplained weight loss, other


Hematologic/Lymphatic:  Denies: no symptoms, anemia, easy bleeding, easy 

bruising, other


Allergies:  


Coded Allergies:  


     No Known Allergies (Unverified , 12/26/18)


Subjective


12/29: Pt awake and alert. No acute events. WBC remains elevated, remains on 

abx. Afebrile. H/H stable. 


12/30: k was low, has drainage of wound, seen by surg, BS still high, being 

adjusted iss





Objective





Last 24 Hour Vital Signs








  Date Time  Temp Pulse Resp B/P (MAP) Pulse Ox O2 Delivery O2 Flow Rate FiO2


 


12/30/18 09:42 98.6       


 


12/30/18 09:00      Room Air  


 


12/30/18 08:40    141/78    


 


12/30/18 08:00 100.0 101 20 150/80 (103) 98   


 


12/30/18 08:00  100      


 


12/30/18 04:00 98.2 89 18 138/72 (94) 98   


 


12/30/18 03:38  93      


 


12/30/18 00:00 101.8 97 18 146/86 (106) 97   


 


12/29/18 23:44  97      


 


12/29/18 21:00      Room Air  


 


12/29/18 20:00 98.6 100 18 141/78 (99) 97   


 


12/29/18 19:45  104      


 


12/29/18 16:00  120      


 


12/29/18 16:00 102.5 123 18 147/90 (109) 97   


 


12/29/18 15:13 99.5       

















Intake and Output  


 


 12/29/18 12/30/18





 19:00 07:00


 


Intake Total 2220.0 ml 687.5 ml


 


Output Total  400 ml


 


Balance 2220.0 ml 287.5 ml


 


  


 


Intake Oral 600 ml 


 


IV Total 1620.0 ml 687.5 ml


 


Output Urine Total  400 ml


 


# Voids 4 


 


# Bowel Movements  1








Laboratory Tests


12/29/18 21:30: Vancomycin Level Trough 20.0H


12/30/18 07:08: 


White Blood Count 19.4H, Red Blood Count 3.17L, Hemoglobin 9.8L, Hematocrit 

28.6L, Mean Corpuscular Volume 90, Mean Corpuscular Hemoglobin 30.8, Mean 

Corpuscular Hemoglobin Concent 34.2, Red Cell Distribution Width 11.3L, 

Platelet Count 193, Mean Platelet Volume 7.3, Neutrophils (%) (Auto) , 

Lymphocytes (%) (Auto) , Monocytes (%) (Auto) , Eosinophils (%) (Auto) , 

Basophils (%) (Auto) , Differential Total Cells Counted 100, Neutrophils % (

Manual) 82H, Lymphocytes % (Manual) 6L, Monocytes % (Manual) 5, Eosinophils % (

Manual) 1, Basophils % (Manual) 0, Band Neutrophils 6, Platelet Estimate 

Adequate, Platelet Morphology Normal, Hypochromasia 1+, Sodium Level 131L, 

Potassium Level 3.1L, Chloride Level 100, Carbon Dioxide Level 26, Anion Gap 5, 

Blood Urea Nitrogen 16, Creatinine 0.9, Estimat Glomerular Filtration Rate > 60

, Glucose Level 281H, Calcium Level 7.5L


Height (Feet):  5


Height (Inches):  3.00


Weight (Pounds):  142


Objective


General Appearance:  alert, no acute distress


Vitals:  Have been reviewed. 


Lines, tubes and drains:  peripheral


HEENT:  normocephalic, atraumatic


Neck:  non-tender, normal alignment, supple, normal inspection


Respiratory/Chest:  chest wall non-tender, lungs clear, normal breath sounds, 

no respiratory distress, no accessory muscle use


Cardiovascular/Chest:  regular rhythm, no gallop/murmur, tachycardia


Abdomen:  normal bowel sounds, non tender, soft, no organomegaly, no mass


Extremities:  other - right calf TTP+, painful to active motion


Skin Exam:  other - right calf with cellulitis/erythema, blistering.


Neurologic:  CNs II-XII grossly normal, no motor/sensory deficits, alert, 

oriented x 3, responsive, normal mood/affect











Quintin Yeh MD Dec 30, 2018 13:05

## 2018-12-30 NOTE — NUR
CASE MANAGEMENT:REVIEW



12/29/2018



SI: SEPSIS. RT LEG ABSCESS W/POSSIBLE NECROTIZING INFECTION

S/P INCISION,DRAINAGE AND DEBRIDEMENT

T 97.9 HR 92 RR 18 B/P 142/72 SATS 100% ON RA 

WBC 23.8  CL 97 GLUCOSE 347 CA 7.4 



IS: IVF@75 mL/HR

IV VANCOMYCIN Q8HRS

IV ZOSYN Q8HRS

LISINOPRIL  PO QD

HEPARIN SQ Q8HRS

IV DILAUDID Q3HRS PRN



**: TELEMETRY STATUS 



12/30/2018



SI: SEPSIS. RT LEG ABSCESS W/POSSIBLE NECROTIZING INFECTION

S/P INCISION,DRAINAGE AND DEBRIDEMENT

T 98.8  RR 18 B/P 149/71 SATS 96% ON  

WBC 19.4  K 3.1  CA 7.5 



IS: IVF@75 mL/HR

IV VANCOMYCIN Q8HRS

IV ZOSYN Q8HRS

LISINOPRIL  PO QD

HEPARIN SQ Q8HRS

IV DILAUDID Q3HRS PRN



**: TELEMETRY STATUS

## 2018-12-30 NOTE — NUR
NURSE NOTES:

left a message to dr grant regarding k level of 3.1. awaiting callback and new orders as of 
this time.

## 2018-12-31 VITALS — SYSTOLIC BLOOD PRESSURE: 146 MMHG | DIASTOLIC BLOOD PRESSURE: 85 MMHG

## 2018-12-31 VITALS — DIASTOLIC BLOOD PRESSURE: 76 MMHG | SYSTOLIC BLOOD PRESSURE: 143 MMHG

## 2018-12-31 VITALS — SYSTOLIC BLOOD PRESSURE: 169 MMHG | DIASTOLIC BLOOD PRESSURE: 91 MMHG

## 2018-12-31 VITALS — SYSTOLIC BLOOD PRESSURE: 152 MMHG | DIASTOLIC BLOOD PRESSURE: 78 MMHG

## 2018-12-31 VITALS — SYSTOLIC BLOOD PRESSURE: 146 MMHG | DIASTOLIC BLOOD PRESSURE: 79 MMHG

## 2018-12-31 VITALS — SYSTOLIC BLOOD PRESSURE: 125 MMHG | DIASTOLIC BLOOD PRESSURE: 76 MMHG

## 2018-12-31 LAB
ADD MANUAL DIFF: NO
ANION GAP SERPL CALC-SCNC: 9 MMOL/L (ref 5–15)
BASOPHILS NFR BLD AUTO: 0.9 % (ref 0–2)
BUN SERPL-MCNC: 8 MG/DL (ref 7–18)
CALCIUM SERPL-MCNC: 7.5 MG/DL (ref 8.5–10.1)
CHLORIDE SERPL-SCNC: 105 MMOL/L (ref 98–107)
CO2 SERPL-SCNC: 27 MMOL/L (ref 21–32)
CREAT SERPL-MCNC: 0.9 MG/DL (ref 0.55–1.3)
EOSINOPHIL NFR BLD AUTO: 0.5 % (ref 0–3)
ERYTHROCYTE [DISTWIDTH] IN BLOOD BY AUTOMATED COUNT: 11.7 % (ref 11.6–14.8)
HCT VFR BLD CALC: 28.2 % (ref 42–52)
HGB BLD-MCNC: 9.5 G/DL (ref 14.2–18)
LYMPHOCYTES NFR BLD AUTO: 6.5 % (ref 20–45)
MCV RBC AUTO: 89 FL (ref 80–99)
MONOCYTES NFR BLD AUTO: 7.7 % (ref 1–10)
NEUTROPHILS NFR BLD AUTO: 84.3 % (ref 45–75)
PLATELET # BLD: 269 K/UL (ref 150–450)
POTASSIUM SERPL-SCNC: 2.9 MMOL/L (ref 3.5–5.1)
RBC # BLD AUTO: 3.16 M/UL (ref 4.7–6.1)
SODIUM SERPL-SCNC: 140 MMOL/L (ref 136–145)
WBC # BLD AUTO: 17.2 K/UL (ref 4.8–10.8)

## 2018-12-31 RX ADMIN — DOCUSATE SODIUM SCH MG: 100 CAPSULE, LIQUID FILLED ORAL at 08:30

## 2018-12-31 RX ADMIN — INSULIN DETEMIR SCH UNITS: 100 INJECTION, SOLUTION SUBCUTANEOUS at 21:27

## 2018-12-31 RX ADMIN — VANCOMYCIN HCL-SODIUM CHLORIDE IV SOLN 1.25 GM/250ML-0.9% SCH MLS/HR: 1.25-0.9/25 SOLUTION at 16:20

## 2018-12-31 RX ADMIN — INSULIN ASPART SCH UNITS: 100 INJECTION, SOLUTION INTRAVENOUS; SUBCUTANEOUS at 21:26

## 2018-12-31 RX ADMIN — LISINOPRIL SCH MG: 20 TABLET ORAL at 08:31

## 2018-12-31 RX ADMIN — INSULIN ASPART SCH UNITS: 100 INJECTION, SOLUTION INTRAVENOUS; SUBCUTANEOUS at 06:51

## 2018-12-31 RX ADMIN — DOCUSATE SODIUM SCH MG: 100 CAPSULE, LIQUID FILLED ORAL at 21:00

## 2018-12-31 RX ADMIN — DIPHENHYDRAMINE HYDROCHLORIDE PRN MG: 50 INJECTION INTRAMUSCULAR; INTRAVENOUS at 12:02

## 2018-12-31 RX ADMIN — VANCOMYCIN HCL-SODIUM CHLORIDE IV SOLN 1.25 GM/250ML-0.9% SCH MLS/HR: 1.25-0.9/25 SOLUTION at 06:54

## 2018-12-31 RX ADMIN — DIPHENHYDRAMINE HYDROCHLORIDE PRN MG: 50 INJECTION INTRAMUSCULAR; INTRAVENOUS at 07:45

## 2018-12-31 RX ADMIN — INSULIN ASPART SCH UNITS: 100 INJECTION, SOLUTION INTRAVENOUS; SUBCUTANEOUS at 11:36

## 2018-12-31 RX ADMIN — VANCOMYCIN HCL-SODIUM CHLORIDE IV SOLN 1.25 GM/250ML-0.9% SCH MLS/HR: 1.25-0.9/25 SOLUTION at 21:30

## 2018-12-31 RX ADMIN — INSULIN ASPART SCH UNITS: 100 INJECTION, SOLUTION INTRAVENOUS; SUBCUTANEOUS at 16:18

## 2018-12-31 RX ADMIN — DIPHENHYDRAMINE HYDROCHLORIDE PRN MG: 50 INJECTION INTRAMUSCULAR; INTRAVENOUS at 15:32

## 2018-12-31 NOTE — NUR
NURSE NOTES:

left a message to dr grant regarding k level of 2.9. awaiting callback and new orders as of 
this time.

## 2018-12-31 NOTE — NUR
CASE MANAGEMENT:REVIEW



12/31/18

SI: SEPSIS. RT LEG ABSCESS W/POSSIBLE NECROTIZING INFECTION

S/P INCISION,DRAINAGE AND DEBRIDEMENT

98.2    91  20  143/76   98% ON RA

WBC+17.2   H/H-9.5/28.2    K-2.9





IS: IV KCL Q1HRS X4

IVF@125/HR

IV VANCOMYCIN Q8HRS

IV ZOSYN Q8HRS

LISINOPRIL  PO QD

HEPARIN SQ Q8HRS

IV DILAUDID Q3HRS PRN

**: TELEMETRY STATUS 

DCP; FROM HOME

## 2018-12-31 NOTE — GENERAL PROGRESS NOTE
Assessment/Plan


Problem List:  


(1) MRSA (methicillin resistant Staphylococcus aureus) septicemia


Assessment & Plan:  wound cx mrsa


vanco


abx per ID, appreciate recs


ICD Codes:  A41.02 - Sepsis due to Methicillin resistant Staphylococcus aureus


SNOMED:  09675264, 529610405


(2) Severe sepsis


Assessment & Plan:  wbc 25, tachy with  and temp of 102 on admit


wbc still elevated, pending AM labs


CT done in the ED reviewed and showing concerns for infected hematoma


appreciate surgery eval


s/p I&D 12/26/18 on admit 


dressings c/d/i


cont abx per ID


wound cx MRSA


cont ivf


cont pain control IV morphine


ICD Codes:  A41.9 - Sepsis, unspecified organism; R65.20 - Severe sepsis 

without septic shock


SNOMED:  52332358


(3) Abscess of leg, right


Assessment & Plan:  cont abx per above


cx sent


s/p I&D


ICD Codes:  L02.415 - Cutaneous abscess of right lower limb; R65.20 - Severe 

sepsis without septic shock


SNOMED:  382820656


(4) Cellulitis


Assessment & Plan:  cont abx


monitor closely


ICD Codes:  L03.90 - Cellulitis, unspecified


SNOMED:  429839611


Qualifiers:  


   


(5) Lactic acid acidosis


Assessment & Plan:  due to severe sepsis


LA 2.1


recheck LA 


IVF


ICD Codes:  E87.2 - Acidosis


SNOMED:  46380144


(6) Uncontrolled diabetes mellitus


Assessment & Plan:  iss


inc levimir to 14


monitor, adjust accordingly 


accuchecks


hgba1c


ICD Codes:  E11.65 - Type 2 diabetes mellitus with hyperglycemia


SNOMED:  36675972, 549234215


Qualifiers:  


   Qualified Codes:  E11.65 - Type 2 diabetes mellitus with hyperglycemia


(7) Essential hypertension


Assessment & Plan:  hydralazine prn


start lisinopril 20mg qday


monitor closely


likely exacerbated due to pain


ICD Codes:  I10 - Essential (primary) hypertension


SNOMED:  18631677


(8) Uncontrolled hypertension


Assessment & Plan:  hydralazine prn


started acei


monitor closely


ICD Codes:  I10 - Essential (primary) hypertension


SNOMED:  96310412, 27496282


(9) Hyponatremia


Assessment & Plan:  Na 130 on admit


improving with fluids, now Na 133


fluids


should improved


likely due to dehydration


ICD Codes:  E87.1 - Hypo-osmolality and hyponatremia


SNOMED:  98071794


Status:  stable


Assessment/Plan


DVT Prophylaxis:   SCD, HSQ


Code Status:            Full


I have personally reviewed all labs, medications and imaging





Hospital Classification Declaration: Based on this initial evaluation, and 

depending on the patient's clinical course, I anticipate that this patient will 

require hospitalization for [2-3 midnights for severe sepsis/right leg abscess 

likely requiring surgery and close respiratory/hemodynamic monitoring.





Disposition: Once the patient is stable to leave the hospital, I anticipate the 

patient will likely be discharged to the following environment: home with  vs 

SNF





I spent 60 minutes on this patient's case, and 40 minutes were dedicated to 

counseling and/or care coordination. Discussed with patient/family, nursing 

staff, SW/CM, and consultants regarding clinical status, treatment course, and 

disposition planning.





Time of note may not reflect time of encounter.


------





Date of Discussion: 12/27/18 





A face-to-face discussion with the patient regarding the patient's advanced 

care planning took place during this hospitalization on the above date. The 

discussion included the explanation and discussion of advance directives and 

associated forms/documents, as well as the patient's current code status. We 

also discussed at length the patient's medical conditions (both acute and 

chronic), general prognosis, treatment options, and goals of care. The 

following summarizes the discussion:





Advance Care Planning/Goals of Care:


- Will attempt to fill out an AD and/or POLST with the patient prior to 

discharge, if not already completed


- Continue current evaluation and management of any acute and chronic medical 

issues


- Will continue to support the patient/family


- Will continue to discuss both short- and long-term goals of care





DPOA-HC/Surrogate Decision Maker:


None currently appointed 





Code Status:


Full Code 





AD Forms/Documents Completed:


Deferred 





A total of 31 minutes was spent on this discussion, including counseling, 

answering questions, and completing, if any, pertinent advanced care planning 

forms/documents.





Subjective


Date patient seen:  Dec 31, 2018


Time patient seen:  07:20


Allergies:  


Coded Allergies:  


     No Known Allergies (Unverified , 12/26/18)


Subjective


f/u right leg MRSA abscess/infected hematoma s/p I&D, severe sepsis





s/p I&D on admit 12/26/18


still having pain in the area


denies fevers/chills


no acute events overnight


no cp or sob, no diarrhea/constipation


significant other at bedside








ROS: 14 point ROS negative except per the above subjective





Objective





Last 24 Hour Vital Signs








  Date Time  Temp Pulse Resp B/P (MAP) Pulse Ox O2 Delivery O2 Flow Rate FiO2


 


12/31/18 04:00 98.8 96 18 146/85 (105) 98   


 


12/31/18 04:00  90      


 


12/31/18 00:00 98.3 93 18 146/79 (101) 99   


 


12/30/18 21:00      Room Air  


 


12/30/18 20:00 99.0 94 18 129/65 (86) 97   


 


12/30/18 20:00  93      


 


12/30/18 16:34 98.8       


 


12/30/18 16:00  99      


 


12/30/18 16:00 98.8 100 18 149/71 (97) 96   


 


12/30/18 12:00 97.9 92 18 142/72 (95) 100   


 


12/30/18 12:00  92      


 


12/30/18 09:42 98.6       


 


12/30/18 09:00      Room Air  


 


12/30/18 08:40    141/78    


 


12/30/18 08:00 100.0 101 20 150/80 (103) 98   


 


12/30/18 08:00  100      

















Intake and Output  


 


 12/30/18 12/31/18





 18:59 06:59


 


Intake Total 1986 ml 


 


Output Total 1850 ml 


 


Balance 136 ml 


 


  


 


Intake Oral 360 ml 


 


IV Total 1626 ml 


 


Output Urine Total 1850 ml 


 


# Voids 1 


 


# Bowel Movements 1 1








Laboratory Tests


12/30/18 13:02: Sickle Cell Screen [Pending]


12/30/18 15:00: 


Reticulocyte Count 0.8, Prothrombin Time 11.2, Prothromb Time International 

Ratio 1.1, Fibrinogen 630H, Iron Level 21L, Total Iron Binding Capacity 87L, 

Percent Iron Saturation 24, Unsaturated Iron Binding 66L, Ferritin 803H, 

Lactate Dehydrogenase 272H, Folate 14.7


Height (Feet):  5


Height (Inches):  3.00


Weight (Pounds):  142


Objective





General Appearance:  alert, mild distress


Lines, tubes and drains:  peripheral


HEENT:  normocephalic, atraumatic


Neck:  non-tender, normal alignment, supple, normal inspection


Respiratory/Chest:  chest wall non-tender, lungs clear, normal breath sounds, 

no respiratory distress, no accessory muscle use


Cardiovascular/Chest:  regular rhythm, no gallop/murmur, tachycardia


Abdomen:  normal bowel sounds, non tender, soft, no organomegaly, no mass


Extremities:  right leg dressing c/d/i, painful to active motion as expected


Skin Exam:  other - right calf with cellulitis/erythema, blistering.


Neurologic:  CNs II-XII grossly normal, no motor/sensory deficits, alert, 

oriented x 3, responsive, normal mood/affect











Celi Kilpatrick MD Dec 31, 2018 07:25

## 2018-12-31 NOTE — NUR
NURSE NOTES:

left a message to dr faye regarding k level of 2.9. awaiting callback and new orders as of 
this time.

## 2018-12-31 NOTE — INFECTIOUS DISEASES PROG NOTE
Assessment/Plan


Assessment/Plan


Sepsis 2ry infected R leg/ infected hematoma/ abscess/necrotic myositis


   -12/26 SP I+D


             --OR findings: there was a large foul-smelling abscess evacuated.  

The abscess was evacuated and fascia was identified with edema but no necrosis.

  The fascia was incised and the muscle was identified.  There was some areas 

of muscle necrosis, which required debridement. The remainder of the extremity 

was significantly edematous, but without other identifiable abscess.


   -CT leg: Fluid collection in the soft tissues of the right lateral upper 

calf which may represent evolution of a prior intramuscular hematoma, 

especially given history of remote trauma. There is concern for superimposed 

infection/abscess, especially given skin thickening and subcutaneous stranding 

suggestive of a cellulitis. Correlate clinically.





 - 12/26 OR Cx - MRSA 


 


Fever, improving


Leukocytosis, improving





DM





Plan:


-Continue empiric IV Vancomycin d# 3 


    Will switch to Bactrim on D/C





12/29/18 SP Zosyn d# 3 





-Monitor CBC/CMP, temperatures


-wound care


-Sx f.u





Subjective


Allergies:  


Coded Allergies:  


     No Known Allergies (Unverified , 12/26/18)


Subjective


Patient afebrile


Leukocytosis resolving


Pain controlled


Serosanguineous drainage from I and D site





Objective


Vital Signs





Last 24 Hour Vital Signs








  Date Time  Temp Pulse Resp B/P (MAP) Pulse Ox O2 Delivery O2 Flow Rate FiO2


 


12/31/18 04:00 98.8 96 18 146/85 (105) 98   


 


12/31/18 04:00  90      


 


12/31/18 00:00 98.3 93 18 146/79 (101) 99   


 


12/30/18 21:00      Room Air  


 


12/30/18 20:00 99.0 94 18 129/65 (86) 97   


 


12/30/18 20:00  93      


 


12/30/18 16:34 98.8       


 


12/30/18 16:00  99      


 


12/30/18 16:00 98.8 100 18 149/71 (97) 96   


 


12/30/18 12:00 97.9 92 18 142/72 (95) 100   


 


12/30/18 12:00  92      


 


12/30/18 09:42 98.6       


 


12/30/18 09:00      Room Air  


 


12/30/18 08:40    141/78    


 


12/30/18 08:00 100.0 101 20 150/80 (103) 98   


 


12/30/18 08:00  100      








Height (Feet):  5


Height (Inches):  3.00


Weight (Pounds):  142


Objective


General:  NAD


HEENT:  normocephalic, atraumatic, MMM, EOMI


Respiratory/Chest:  CTAB, No W


Cardiovascular/Chest:  regular rhythm, No M/R/G


Abdomen:  normal bowel sounds, non tender, soft, no organomegaly, no mass


Extremities:  right leg dressing c/d/i





Microbiology








 Date/Time


Source Procedure


Growth Status


 


 


 12/28/18 07:30


Wound Gram Stain - Final Complete


 


 12/28/18 07:30 Aerobic Culture - Final


Staphylococcus Aureus - Mrsa Complete











Laboratory Tests








Test


  12/30/18


13:02 12/30/18


15:00


 


Sickle Cell Screen Pending   


 


Reticulocyte Count


  


  0.8 %


(0.0-2.0)


 


Prothrombin Time


  


  11.2 SEC


(9.30-11.50)


 


Prothromb Time International


Ratio 


  1.1 (0.9-1.1)  


 


 


Fibrinogen


  


  630 mg/dL


(200-400)  H


 


Iron Level


  


  21 ug/dL


()  L


 


Total Iron Binding Capacity


  


  87 ug/dL


(250-450)  L


 


Percent Iron Saturation  24 % (15-50)  


 


Unsaturated Iron Binding


  


  66 ug/dL


(112-346)  L


 


Ferritin


  


  803 NG/ML


(8-388)  H


 


Lactate Dehydrogenase


  


  272 U/L


()  H


 


Folate


  


  14.7 NG/ML


(8.6-58.9)











Current Medications








 Medications


  (Trade)  Dose


 Ordered  Sig/Shae


 Route


 PRN Reason  Start Time


 Stop Time Status Last Admin


Dose Admin


 


 Acetaminophen


  (Tylenol)  650 mg  Q4H  PRN


 ORAL


 Mild Pain/Temp > 100.5  12/27/18 20:45


 1/26/19 20:44  12/30/18 09:12


 


 


 Al Hydroxide/Mg


 Hydroxide


  (Mylanta II)  30 ml  Q6H  PRN


 ORAL


 dyspepsia  12/26/18 18:45


 1/25/19 18:44   


 


 


 Dextrose


  (Dextrose 50%)  25 ml  Q30M  PRN


 IV


 Hypoglycemia  12/29/18 16:15


 1/28/19 16:14   


 


 


 Dextrose


  (Dextrose 50%)  50 ml  Q30M  PRN


 IV


 Hypoglycemia  12/29/18 16:15


 1/28/19 16:14   


 


 


 Diphenhydramine


 HCl


  (Benadryl)  12.5 mg  Q6H  PRN


 IVP


 Itching/Pruritis  12/26/18 21:45


 1/25/19 21:44   


 


 


 Docusate Sodium


  (Colace)  100 mg  EVERY 12  HOURS


 ORAL


   12/26/18 21:00


 1/25/19 20:59  12/30/18 21:16


 


 


 Famotidine


  (Pepcid)  40 mg  DAILY


 ORAL


   12/27/18 09:00


 1/26/19 08:59  12/30/18 08:41


 


 


 Hydralazine HCl


  (Apresoline)  10 mg  Q6H  PRN


 ORAL


 hypertension  12/26/18 20:00


 1/25/19 19:59   


 


 


 Hydromorphone HCl


  (Dilaudid)  0.5 mg  Q3H  PRN


 IVP


 For Pain  12/26/18 18:45


 1/2/19 18:44   


 


 


 Hydromorphone HCl


  (Dilaudid)  1 mg  Q3H  PRN


 IVP


 Moderate Pain (Pain Scale 4-6)  12/26/18 18:45


 1/2/19 18:44  12/30/18 17:54


 


 


 Hydromorphone HCl


  (Dilaudid)  2 mg  Q3H  PRN


 IVP


 Severe Pain (Pain Scale 7-10)  12/26/18 18:45


 1/2/19 18:44  12/29/18 21:04


 


 


 Ibuprofen


  (Motrin)  600 mg  THREE TIMES A DAY  PRN


 ORAL


 Fever/Headache/Mild Pain  12/29/18 17:30


 1/28/19 17:29  12/30/18 16:04


 


 


 Insulin Aspart


  (NovoLOG)    BEFORE MEALS AND  HS


 SUBQ


   12/29/18 16:30


 1/28/19 16:29  12/31/18 06:51


 


 


 Insulin Detemir


  (Levemir)  14 units  QHS


 SUBQ


   12/30/18 21:00


 1/27/19 20:59  12/30/18 21:23


 


 


 Lisinopril


  (Prinivil)  20 mg  DAILY


 ORAL


   12/27/18 09:00


 1/26/19 08:59  12/30/18 08:40


 


 


 Lorazepam


  (Ativan 2mg/ml


 1ml)  0.5 mg  Q4H  PRN


 IV


 For Anxiety  12/26/18 18:45


 1/2/19 18:44   


 


 


 Magnesium


 Hydroxide


  (Mom)  30 ml  BIDPRN  PRN


 ORAL


 Constipation  12/26/18 21:45


 1/25/19 21:44   


 


 


 Ondansetron HCl


  (Zofran)  4 mg  Q6H  PRN


 IVP


 Nausea & Vomiting  12/26/18 18:45


 1/25/19 18:44   


 


 


 Sodium Chloride  1,000 ml @ 


 125 mls/hr  Q8H


 IV


   12/28/18 16:44


 1/27/19 16:43  12/30/18 23:17


 


 


 Temazepam


  (Restoril)  7.5 mg  DAILYPRN  PRN


 ORAL


 Insomnia  12/26/18 21:45


 1/2/19 21:44   


 


 


 Vancomycin HCl


  (Vanco rx to


 dose)  1 ea  DAILY  PRN


 MISC


 Per rx protocol  12/26/18 20:00


 1/25/19 19:59   


 


 


 Vancomycin HCl/


 Dextrose  250 ml @ 


 167 mls/hr  Q8H


 IVPB


   12/28/18 23:00


 1/2/19 22:59  12/31/18 06:54


 

















Shekhar Vigil MD Dec 31, 2018 07:21

## 2019-01-01 VITALS — DIASTOLIC BLOOD PRESSURE: 77 MMHG | SYSTOLIC BLOOD PRESSURE: 147 MMHG

## 2019-01-01 VITALS — SYSTOLIC BLOOD PRESSURE: 148 MMHG | DIASTOLIC BLOOD PRESSURE: 77 MMHG

## 2019-01-01 VITALS — SYSTOLIC BLOOD PRESSURE: 161 MMHG | DIASTOLIC BLOOD PRESSURE: 79 MMHG

## 2019-01-01 VITALS — SYSTOLIC BLOOD PRESSURE: 122 MMHG | DIASTOLIC BLOOD PRESSURE: 58 MMHG

## 2019-01-01 VITALS — SYSTOLIC BLOOD PRESSURE: 144 MMHG | DIASTOLIC BLOOD PRESSURE: 78 MMHG

## 2019-01-01 VITALS — SYSTOLIC BLOOD PRESSURE: 147 MMHG | DIASTOLIC BLOOD PRESSURE: 78 MMHG

## 2019-01-01 LAB
ADD MANUAL DIFF: NO
ANION GAP SERPL CALC-SCNC: 10 MMOL/L (ref 5–15)
BASOPHILS NFR BLD AUTO: 1.4 % (ref 0–2)
BUN SERPL-MCNC: 10 MG/DL (ref 7–18)
CALCIUM SERPL-MCNC: 7.5 MG/DL (ref 8.5–10.1)
CHLORIDE SERPL-SCNC: 105 MMOL/L (ref 98–107)
CO2 SERPL-SCNC: 26 MMOL/L (ref 21–32)
CREAT SERPL-MCNC: 1 MG/DL (ref 0.55–1.3)
EOSINOPHIL NFR BLD AUTO: 1 % (ref 0–3)
ERYTHROCYTE [DISTWIDTH] IN BLOOD BY AUTOMATED COUNT: 11.8 % (ref 11.6–14.8)
HCT VFR BLD CALC: 28 % (ref 42–52)
HGB BLD-MCNC: 9.2 G/DL (ref 14.2–18)
LYMPHOCYTES NFR BLD AUTO: 11.3 % (ref 20–45)
MCV RBC AUTO: 91 FL (ref 80–99)
MONOCYTES NFR BLD AUTO: 8.4 % (ref 1–10)
NEUTROPHILS NFR BLD AUTO: 77.9 % (ref 45–75)
PLATELET # BLD: 357 K/UL (ref 150–450)
POTASSIUM SERPL-SCNC: 3.1 MMOL/L (ref 3.5–5.1)
RBC # BLD AUTO: 3.1 M/UL (ref 4.7–6.1)
SODIUM SERPL-SCNC: 141 MMOL/L (ref 136–145)
WBC # BLD AUTO: 17.6 K/UL (ref 4.8–10.8)

## 2019-01-01 RX ADMIN — DOCUSATE SODIUM SCH MG: 100 CAPSULE, LIQUID FILLED ORAL at 08:39

## 2019-01-01 RX ADMIN — VANCOMYCIN HCL-SODIUM CHLORIDE IV SOLN 1.25 GM/250ML-0.9% SCH MLS/HR: 1.25-0.9/25 SOLUTION at 17:30

## 2019-01-01 RX ADMIN — DIPHENHYDRAMINE HYDROCHLORIDE PRN MG: 50 INJECTION INTRAMUSCULAR; INTRAVENOUS at 18:30

## 2019-01-01 RX ADMIN — VANCOMYCIN HCL-SODIUM CHLORIDE IV SOLN 1.25 GM/250ML-0.9% SCH MLS/HR: 1.25-0.9/25 SOLUTION at 06:20

## 2019-01-01 RX ADMIN — DIPHENHYDRAMINE HYDROCHLORIDE PRN MG: 50 INJECTION INTRAMUSCULAR; INTRAVENOUS at 06:21

## 2019-01-01 RX ADMIN — INSULIN ASPART SCH UNITS: 100 INJECTION, SOLUTION INTRAVENOUS; SUBCUTANEOUS at 12:28

## 2019-01-01 RX ADMIN — INSULIN ASPART SCH UNITS: 100 INJECTION, SOLUTION INTRAVENOUS; SUBCUTANEOUS at 21:50

## 2019-01-01 RX ADMIN — INSULIN ASPART SCH UNITS: 100 INJECTION, SOLUTION INTRAVENOUS; SUBCUTANEOUS at 06:26

## 2019-01-01 RX ADMIN — DIPHENHYDRAMINE HYDROCHLORIDE PRN MG: 50 INJECTION INTRAMUSCULAR; INTRAVENOUS at 00:54

## 2019-01-01 RX ADMIN — DIPHENHYDRAMINE HYDROCHLORIDE PRN MG: 50 INJECTION INTRAMUSCULAR; INTRAVENOUS at 23:07

## 2019-01-01 RX ADMIN — INSULIN ASPART SCH UNITS: 100 INJECTION, SOLUTION INTRAVENOUS; SUBCUTANEOUS at 17:28

## 2019-01-01 RX ADMIN — INSULIN DETEMIR SCH UNITS: 100 INJECTION, SOLUTION SUBCUTANEOUS at 21:50

## 2019-01-01 RX ADMIN — DOCUSATE SODIUM SCH MG: 100 CAPSULE, LIQUID FILLED ORAL at 21:00

## 2019-01-01 RX ADMIN — LISINOPRIL SCH MG: 20 TABLET ORAL at 09:03

## 2019-01-01 NOTE — GENERAL PROGRESS NOTE
Assessment/Plan


Assessment/Plan


# Leukocytosis. Secondary to underlying infection, sepsis, cellulitis. leg 

abscess s/p drainage


--> Peripheral has been ordered, results are pending 


--> Medications have been reviewed 


--> Imaging has been reviewed. CXR shows no acute disease. 


--> has been started on abx, empiric treatment 


# Anemia of chronic disease due to underlying chronic medical issues, 

multifactorial. 


--> Current Hgb decreased, have reviewed w/u


--> Monitor for stability 


# Sepsis. ID is follwoing, appreciate recs. 


--> On abx. 


--> IVF 


# Abscess of R leg. Surgery is following, appreciate recs. 


--> S/P I&D


--> abx 


--> CT done in the ED reviewed and showing concerns for infected hematoma


# Cellulitis


# Lactic acid acidosis. Due to severe sepsis. 


# DM


# HTN. 


# DVT prophylaxis. 








GREATLY APPRECIATE CONSULTATION. 





Date and time note is entered does not necessarily reflect the time of 

encounter.





Subjective


Constitutional:  Denies: no symptoms, chills, diaphoresis, fever, malaise, 

weakness, other


HEENT:  Denies: no symptoms, eye pain, blurred vision, tearing, double vision, 

ear pain, ear discharge, nose pain, nose congestion, throat pain, throat 

swelling, mouth pain, mouth swelling, other


Cardiovascular:  Denies: no symptoms, chest pain, edema, irregular heart rate, 

lightheadedness, palpitations, syncope, other


Respiratory:  Denies: no symptoms, cough, orthopnea, shortness of breath, SOB 

with excertion, SOB at rest, sputum, stridor, wheezing, other


Genitourinary:  Denies: no symptoms, burning, discharge, frequency, flank pain, 

hematuria, incontinence, pain, urgency, other


Neurologic/Psychiatric:  Denies: no symptoms, anxiety, depressed, emotional 

problems, headache, numbness, paresthesia, pre-existing deficit, seizure, 

tingling, tremors, weakness, other


Endocrine:  Denies: no symptoms, excessive sweating, flushing, intolerance to 

cold, intolerance to heat, increased hunger, increased thirst, increased urine, 

unexplained weight gain, unexplained weight loss, other


Hematologic/Lymphatic:  Denies: no symptoms, anemia, easy bleeding, easy 

bruising, other


Allergies:  


Coded Allergies:  


     No Known Allergies (Unverified , 12/26/18)


Subjective


12/29: Pt awake and alert. No acute events. WBC remains elevated, remains on 

abx. Afebrile. H/H stable. 


12/30: k was low, has drainage of wound, seen by surg, BS still high, being 

adjusted iss


1/1: feeling better, overall, s/o drainage of right leg abscess, monitoring 

counts, no fevers





Objective





Last 24 Hour Vital Signs








  Date Time  Temp Pulse Resp B/P (MAP) Pulse Ox O2 Delivery O2 Flow Rate FiO2


 


1/1/19 16:00  88      


 


1/1/19 16:00 96.8 89 20 122/58 (79) 97   


 


1/1/19 12:00 98.6 95 20 161/79 (106) 98   


 


1/1/19 12:00  88      


 


1/1/19 09:03    144/78    


 


1/1/19 08:00 98.1 96 20 144/78 (100) 99   


 


1/1/19 08:00      Room Air  


 


1/1/19 08:00  99      


 


1/1/19 04:00  88      


 


1/1/19 04:00 97.9 95 20 147/77 (100) 97   


 


1/1/19 00:00 98.9 96 18 148/77 (100) 98   


 


1/1/19 00:00  92      


 


12/31/18 21:00      Room Air  

















Intake and Output  


 


 12/31/18 1/1/19





 19:00 07:00


 


Intake Total 3712 ml 720 ml


 


Output Total 3700 ml 1400 ml


 


Balance 12 ml -680 ml


 


  


 


Intake Oral 390 ml 


 


IV Total 1842 ml 


 


Other 1480 ml 720 ml


 


Output Urine Total 3700 ml 1400 ml


 


# Voids  3


 


# Bowel Movements 1 1








Laboratory Tests


1/1/19 02:00: Stool Occult Blood Positive


1/1/19 06:10: 


White Blood Count 17.6H, Red Blood Count 3.10L, Hemoglobin 9.2L, Hematocrit 

28.0L, Mean Corpuscular Volume 91, Mean Corpuscular Hemoglobin 29.8, Mean 

Corpuscular Hemoglobin Concent 32.9, Red Cell Distribution Width 11.8, Platelet 

Count 357, Mean Platelet Volume 6.3L, Neutrophils (%) (Auto) 77.9H, Lymphocytes 

(%) (Auto) 11.3L, Monocytes (%) (Auto) 8.4, Eosinophils (%) (Auto) 1.0, 

Basophils (%) (Auto) 1.4, Sodium Level 141, Potassium Level 3.1L, Chloride 

Level 105, Carbon Dioxide Level 26, Anion Gap 10, Blood Urea Nitrogen 10, 

Creatinine 1.0, Estimat Glomerular Filtration Rate > 60, Glucose Level 115H, 

Calcium Level 7.5L, Vancomycin Level Trough 24.8H


Height (Feet):  5


Height (Inches):  3.00


Weight (Pounds):  142


Objective


General Appearance:  alert, no acute distress


Vitals:  Have been reviewed. 


Lines, tubes and drains:  peripheral


HEENT:  normocephalic, atraumatic


Neck:  non-tender, normal alignment, supple, normal inspection


Respiratory/Chest:  chest wall non-tender, lungs clear, normal breath sounds, 

no respiratory distress, no accessory muscle use


Cardiovascular/Chest:  regular rhythm, no gallop/murmur, tachycardia


Abdomen:  normal bowel sounds, non tender, soft, no organomegaly, no mass


Extremities:  other - right calf TTP+, painful to active motion


Skin Exam:  other - right calf with cellulitis/erythema, blistering. +++ r leg 

abscess











Quintin Yeh MD Jan 1, 2019 20:54

## 2019-01-01 NOTE — GENERAL PROGRESS NOTE
Progress Note


Progress Note


surgery





wound stable


edema improved


clinically looks good


leukocytosis not resolving





CT scan leg and foot 


cont abx


cont with dressings changes


will follow with recs. 


thank you











Tony Fox Jan 1, 2019 12:46

## 2019-01-01 NOTE — GENERAL PROGRESS NOTE
Assessment/Plan


Problem List:  


(1) MRSA (methicillin resistant Staphylococcus aureus) septicemia


Assessment & Plan:  wound cx mrsa


vanco


abx per ID, appreciate recs


ICD Codes:  A41.02 - Sepsis due to Methicillin resistant Staphylococcus aureus


SNOMED:  88998679, 281338248


(2) Severe sepsis


Assessment & Plan:  wbc 25, tachy with  and temp of 102 on admit


CT done in the ED reviewed and showing concerns for infected hematoma


wbc still elevated, improving.. continue to monitor closely, temp of 100.4 

overnight


appreciate surgery eval


s/p I&D 12/26/18 on admit 


dressings c/d/i


cont abx per ID


wound cx MRSA


cont ivf


cont pain control IV morphine


ICD Codes:  A41.9 - Sepsis, unspecified organism; R65.20 - Severe sepsis 

without septic shock


SNOMED:  77966928


(3) Abscess of leg, right


Assessment & Plan:  cont abx per above


cx sent


s/p I&D


ICD Codes:  L02.415 - Cutaneous abscess of right lower limb; R65.20 - Severe 

sepsis without septic shock


SNOMED:  550855668


(4) Cellulitis


Assessment & Plan:  cont abx


monitor closely


ICD Codes:  L03.90 - Cellulitis, unspecified


SNOMED:  384722355


Qualifiers:  


   


(5) Lactic acid acidosis


Assessment & Plan:  due to severe sepsis


LA 2.1


recheck LA 


IVF


ICD Codes:  E87.2 - Acidosis


SNOMED:  76275236


(6) Uncontrolled diabetes mellitus


Assessment & Plan:  iss


inc levimir to 14


monitor, adjust accordingly 


accuchecks


hgba1c


ICD Codes:  E11.65 - Type 2 diabetes mellitus with hyperglycemia


SNOMED:  87246230, 926259377


Qualifiers:  


   Qualified Codes:  E11.65 - Type 2 diabetes mellitus with hyperglycemia


(7) Essential hypertension


Assessment & Plan:  hydralazine prn


start lisinopril 20mg qday


monitor closely


likely exacerbated due to pain


ICD Codes:  I10 - Essential (primary) hypertension


SNOMED:  10328930


(8) Uncontrolled hypertension


Assessment & Plan:  hydralazine prn


started acei


monitor closely


ICD Codes:  I10 - Essential (primary) hypertension


SNOMED:  08489792, 81763404


(9) Hyponatremia


Assessment & Plan:  Na 130 on admit


improving with fluids, now Na 133


fluids


should improved


likely due to dehydration


ICD Codes:  E87.1 - Hypo-osmolality and hyponatremia


SNOMED:  19996938


Status:  stable


Assessment/Plan


DVT Prophylaxis:   SCD, HSQ


Code Status:            Full


I have personally reviewed all labs, medications and imaging





Hospital Classification Declaration: Based on this initial evaluation, and 

depending on the patient's clinical course, I anticipate that this patient will 

require hospitalization for [2-3 midnights for severe sepsis/right leg abscess 

likely requiring surgery and close respiratory/hemodynamic monitoring.





Disposition: Once the patient is stable to leave the hospital, I anticipate the 

patient will likely be discharged to the following environment: home with HH vs 

SNF





I spent 55 minutes on this patient's case, and 40 minutes were dedicated to 

counseling and/or care coordination. Discussed with patient/family, nursing 

staff, SW/CM, and consultants regarding clinical status, treatment course, and 

disposition planning.





Time of note may not reflect time of encounter.


------





Date of Discussion: 12/27/18 





A face-to-face discussion with the patient regarding the patient's advanced 

care planning took place during this hospitalization on the above date. The 

discussion included the explanation and discussion of advance directives and 

associated forms/documents, as well as the patient's current code status. We 

also discussed at length the patient's medical conditions (both acute and 

chronic), general prognosis, treatment options, and goals of care. The 

following summarizes the discussion:





Advance Care Planning/Goals of Care:


- Will attempt to fill out an AD and/or POLST with the patient prior to 

discharge, if not already completed


- Continue current evaluation and management of any acute and chronic medical 

issues


- Will continue to support the patient/family


- Will continue to discuss both short- and long-term goals of care





DPOA-HC/Surrogate Decision Maker:


None currently appointed 





Code Status:


Full Code 





AD Forms/Documents Completed:


Deferred 





A total of 31 minutes was spent on this discussion, including counseling, 

answering questions, and completing, if any, pertinent advanced care planning 

forms/documents.





Subjective


Date patient seen:  Jan 1, 2019


Time patient seen:  06:44


Allergies:  


Coded Allergies:  


     No Known Allergies (Unverified , 12/26/18)


Subjective


f/u right leg MRSA abscess/infected hematoma s/p I&D, severe sepsis





s/p I&D on admit 12/26/18


still having pain in the area


dressing in place


having mild fevers overnight, 100.4 


no acute events overnight


no cp or sob, no diarrhea/constipation











ROS: 14 point ROS negative except per the above subjective





Objective





Last 24 Hour Vital Signs








  Date Time  Temp Pulse Resp B/P (MAP) Pulse Ox O2 Delivery O2 Flow Rate FiO2


 


1/1/19 04:00  88      


 


1/1/19 04:00 97.9 95 20 147/77 (100) 97   


 


1/1/19 00:00 98.9 96 18 148/77 (100) 98   


 


1/1/19 00:00  92      


 


12/31/18 21:00      Room Air  


 


12/31/18 20:00  87      


 


12/31/18 20:00 100.0 96 18 169/91 (117) 97   


 


12/31/18 16:00 98.8 91 21 125/76 (92) 99   


 


12/31/18 16:00  96      


 


12/31/18 12:00 97.9 88 20 152/78 (102) 98   


 


12/31/18 12:00  85      


 


12/31/18 09:00      Room Air  


 


12/31/18 08:31    143/76    


 


12/31/18 08:15 98.2       


 


12/31/18 08:00  81      


 


12/31/18 08:00 98.2 91 20 143/76 (98) 98   

















Intake and Output  


 


 12/31/18 1/1/19





 19:00 07:00


 


Intake Total 3712 ml 720 ml


 


Output Total 3700 ml 1400 ml


 


Balance 12 ml -680 ml


 


  


 


Intake Oral 390 ml 


 


IV Total 1842 ml 


 


Other 1480 ml 720 ml


 


Output Urine Total 3700 ml 1400 ml


 


# Voids  3


 


# Bowel Movements 1 1








Laboratory Tests


12/31/18 08:25: 


White Blood Count 17.2H, Red Blood Count 3.16L, Hemoglobin 9.5L, Hematocrit 

28.2L, Mean Corpuscular Volume 89, Mean Corpuscular Hemoglobin 30.1, Mean 

Corpuscular Hemoglobin Concent 33.6, Red Cell Distribution Width 11.7, Platelet 

Count 269, Mean Platelet Volume 7.6, Neutrophils (%) (Auto) 84.3H, Lymphocytes (

%) (Auto) 6.5L, Monocytes (%) (Auto) 7.7, Eosinophils (%) (Auto) 0.5, Basophils 

(%) (Auto) 0.9, Sodium Level 140, Potassium Level 2.9L, Chloride Level 105, 

Carbon Dioxide Level 27, Anion Gap 9, Blood Urea Nitrogen 8, Creatinine 0.9, 

Estimat Glomerular Filtration Rate > 60, Glucose Level 142#H, Calcium Level 7.5L

, Magnesium Level 1.8


1/1/19 02:00: Stool Occult Blood [Pending]


1/1/19 06:10: Vancomycin Level Trough [Pending]


Height (Feet):  5


Height (Inches):  3.00


Weight (Pounds):  142


Objective





General Appearance:  alert, mild distress


Lines, tubes and drains:  peripheral


HEENT:  normocephalic, atraumatic


Neck:  non-tender, normal alignment, supple, normal inspection


Respiratory/Chest:  chest wall non-tender, lungs clear, normal breath sounds, 

no respiratory distress, no accessory muscle use


Cardiovascular/Chest:  regular rhythm, no gallop/murmur, tachycardia


Abdomen:  normal bowel sounds, non tender, soft, no organomegaly, no mass


Extremities:  right leg dressing c/d/i, painful to active motion as expected


Skin Exam:  other - right calf with cellulitis/erythema, blistering.


Neurologic:  CNs II-XII grossly normal, no motor/sensory deficits, alert, 

oriented x 3, responsive, normal mood/affect











Celi Kilpatrick MD Jan 1, 2019 06:46

## 2019-01-01 NOTE — NUR
NURSE NOTES:

Received report from HARDIK Ibarra. Bed is in lowest position. side rails up X2, and call light is 
within reach. WIll continue to monitor.

## 2019-01-01 NOTE — NUR
NURSE NOTES: Pt dozing in bed, awoke to soft voice, breathing easily on room air, denies SOB 
and denies pain at this time. Vital signs stable with SR @ 92 on monitor. IV access left 
hand with NS running at 125 ml/hr. Pt asking for pain medication, given. Bandage on right 
lower leg dry/intact with good motor/capillary/neuro distal functions in right foot.  Pt has 
walker at bedside. Bed left in low position, side rails up x 2 and call light left near pt's 
hand.

## 2019-01-02 VITALS — SYSTOLIC BLOOD PRESSURE: 138 MMHG | DIASTOLIC BLOOD PRESSURE: 76 MMHG

## 2019-01-02 VITALS — DIASTOLIC BLOOD PRESSURE: 78 MMHG | SYSTOLIC BLOOD PRESSURE: 144 MMHG

## 2019-01-02 VITALS — DIASTOLIC BLOOD PRESSURE: 74 MMHG | SYSTOLIC BLOOD PRESSURE: 140 MMHG

## 2019-01-02 VITALS — DIASTOLIC BLOOD PRESSURE: 78 MMHG | SYSTOLIC BLOOD PRESSURE: 154 MMHG

## 2019-01-02 VITALS — SYSTOLIC BLOOD PRESSURE: 137 MMHG | DIASTOLIC BLOOD PRESSURE: 87 MMHG

## 2019-01-02 VITALS — SYSTOLIC BLOOD PRESSURE: 147 MMHG | DIASTOLIC BLOOD PRESSURE: 77 MMHG

## 2019-01-02 LAB
ADD MANUAL DIFF: NO
ANION GAP SERPL CALC-SCNC: 8 MMOL/L (ref 5–15)
BASOPHILS NFR BLD AUTO: 1.3 % (ref 0–2)
BUN SERPL-MCNC: 7 MG/DL (ref 7–18)
CALCIUM SERPL-MCNC: 7.8 MG/DL (ref 8.5–10.1)
CHLORIDE SERPL-SCNC: 104 MMOL/L (ref 98–107)
CO2 SERPL-SCNC: 29 MMOL/L (ref 21–32)
CREAT SERPL-MCNC: 1 MG/DL (ref 0.55–1.3)
EOSINOPHIL NFR BLD AUTO: 0.9 % (ref 0–3)
ERYTHROCYTE [DISTWIDTH] IN BLOOD BY AUTOMATED COUNT: 11.9 % (ref 11.6–14.8)
HCT VFR BLD CALC: 27.8 % (ref 42–52)
HGB BLD-MCNC: 9.2 G/DL (ref 14.2–18)
LYMPHOCYTES NFR BLD AUTO: 10.9 % (ref 20–45)
MCV RBC AUTO: 90 FL (ref 80–99)
MONOCYTES NFR BLD AUTO: 7.7 % (ref 1–10)
NEUTROPHILS NFR BLD AUTO: 79.2 % (ref 45–75)
PLATELET # BLD: 409 K/UL (ref 150–450)
POTASSIUM SERPL-SCNC: 2.9 MMOL/L (ref 3.5–5.1)
RBC # BLD AUTO: 3.1 M/UL (ref 4.7–6.1)
SODIUM SERPL-SCNC: 141 MMOL/L (ref 136–145)
WBC # BLD AUTO: 14.3 K/UL (ref 4.8–10.8)

## 2019-01-02 RX ADMIN — INSULIN ASPART SCH UNITS: 100 INJECTION, SOLUTION INTRAVENOUS; SUBCUTANEOUS at 16:42

## 2019-01-02 RX ADMIN — DOCUSATE SODIUM SCH MG: 100 CAPSULE, LIQUID FILLED ORAL at 20:33

## 2019-01-02 RX ADMIN — INSULIN ASPART SCH UNITS: 100 INJECTION, SOLUTION INTRAVENOUS; SUBCUTANEOUS at 11:19

## 2019-01-02 RX ADMIN — VANCOMYCIN HCL-SODIUM CHLORIDE IV SOLN 1.25 GM/250ML-0.9% SCH MLS/HR: 1.25-0.9/25 SOLUTION at 06:01

## 2019-01-02 RX ADMIN — LISINOPRIL SCH MG: 20 TABLET ORAL at 09:11

## 2019-01-02 RX ADMIN — INSULIN DETEMIR SCH UNITS: 100 INJECTION, SOLUTION SUBCUTANEOUS at 20:38

## 2019-01-02 RX ADMIN — DOCUSATE SODIUM SCH MG: 100 CAPSULE, LIQUID FILLED ORAL at 09:10

## 2019-01-02 RX ADMIN — INSULIN ASPART SCH UNITS: 100 INJECTION, SOLUTION INTRAVENOUS; SUBCUTANEOUS at 06:03

## 2019-01-02 RX ADMIN — VANCOMYCIN HCL-SODIUM CHLORIDE IV SOLN 1.25 GM/250ML-0.9% SCH MLS/HR: 1.25-0.9/25 SOLUTION at 18:32

## 2019-01-02 RX ADMIN — INSULIN ASPART SCH UNITS: 100 INJECTION, SOLUTION INTRAVENOUS; SUBCUTANEOUS at 20:39

## 2019-01-02 NOTE — GENERAL PROGRESS NOTE
Assessment/Plan


Assessment/Plan


# Leukocytosis. Secondary to underlying infection, sepsis, cellulitis. leg 

abscess s/p drainage


--> Peripheral has been ordered, results are pending 


--> Medications have been reviewed 


--> Imaging has been reviewed. CXR shows no acute disease. 


--> has been started on abx, empiric treatment 


# Anemia of chronic disease due to underlying chronic medical issues, 

multifactorial. 


--> Current Hgb decreased, have reviewed w/u


--> Monitor for stability 


# Sepsis. ID is follwoing, appreciate recs. 


--> On abx. 


--> IVF 


# Abscess of R leg. Surgery is following, appreciate recs. 


--> S/P I&D


--> abx 


--> CT done in the ED reviewed and showing concerns for infected hematoma


# Cellulitis


# Lactic acid acidosis. Due to severe sepsis. 


# DM


# HTN. 


# DVT prophylaxis. 








GREATLY APPRECIATE CONSULTATION. 





Date and time note is entered does not necessarily reflect the time of 

encounter.





Subjective


Constitutional:  Denies: no symptoms, chills, diaphoresis, fever, malaise, 

weakness, other


HEENT:  Denies: no symptoms, eye pain, blurred vision, tearing, double vision, 

ear pain, ear discharge, nose pain, nose congestion, throat pain, throat 

swelling, mouth pain, mouth swelling, other


Cardiovascular:  Denies: no symptoms, chest pain, edema, irregular heart rate, 

lightheadedness, palpitations, syncope, other


Respiratory:  Denies: no symptoms, cough, orthopnea, shortness of breath, SOB 

with excertion, SOB at rest, sputum, stridor, wheezing, other


Gastrointestinal/Abdominal:  Denies: no symptoms, abdomen distended, abdominal 

pain, black stools, tarry stools, blood in stool, constipated, diarrhea, 

difficulty swallowing, nausea, poor appetite, poor fluid intake, rectal bleeding

, vomiting, other


Genitourinary:  Denies: no symptoms, burning, discharge, frequency, flank pain, 

hematuria, incontinence, pain, urgency, other


Neurologic/Psychiatric:  Denies: no symptoms, anxiety, depressed, emotional 

problems, headache, numbness, paresthesia, pre-existing deficit, seizure, 

tingling, tremors, weakness, other


Endocrine:  Denies: no symptoms, excessive sweating, flushing, intolerance to 

cold, intolerance to heat, increased hunger, increased thirst, increased urine, 

unexplained weight gain, unexplained weight loss, other


Hematologic/Lymphatic:  Denies: no symptoms, anemia, easy bleeding, easy 

bruising, other


Allergies:  


Coded Allergies:  


     No Known Allergies (Unverified , 12/26/18)


Subjective


12/29: Pt awake and alert. No acute events. WBC remains elevated, remains on 

abx. Afebrile. H/H stable. 


12/30: k was low, has drainage of wound, seen by surg, BS still high, being 

adjusted iss


1/1: feeling better, overall, s/o drainage of right leg abscess, monitoring 

counts, no fevers


1/2 Pt is awake and alert, resting in bed. Wbc remains elevated, improving. no 

fevers or chills.





Objective





Last 24 Hour Vital Signs








  Date Time  Temp Pulse Resp B/P (MAP) Pulse Ox O2 Delivery O2 Flow Rate FiO2


 


1/2/19 20:00 98.6 86 20 140/74 (96) 97   


 


1/2/19 20:00  90      


 


1/2/19 19:00      Room Air  


 


1/2/19 16:00 98.4 84 20 138/76 (96) 97   


 


1/2/19 15:35  76      


 


1/2/19 12:00 97.5 86 19 154/78 (103) 97   


 


1/2/19 11:15  87      


 


1/2/19 09:11    147/77    


 


1/2/19 09:00      Room Air  


 


1/2/19 08:00 98.2 89 21 147/77 (100) 98   


 


1/2/19 07:16  85      


 


1/2/19 04:00 97.3 70 22 137/87 (104) 91   


 


1/2/19 04:00  83      


 


1/2/19 00:00 99.0 93 20 144/78 (100) 96   

















Intake and Output  


 


 1/1/19 1/2/19





 19:00 07:00


 


Intake Total 680 ml 300 ml


 


Output Total 1100 ml 2600 ml


 


Balance -420 ml -2300 ml


 


  


 


Intake Oral 680 ml 300 ml


 


Output Urine Total 1100 ml 2600 ml


 


# Bowel Movements 1 








Laboratory Tests


1/2/19 08:00: 


White Blood Count 14.3H, Red Blood Count 3.10L, Hemoglobin 9.2L, Hematocrit 

27.8L, Mean Corpuscular Volume 90, Mean Corpuscular Hemoglobin 29.8, Mean 

Corpuscular Hemoglobin Concent 33.1, Red Cell Distribution Width 11.9, Platelet 

Count 409, Mean Platelet Volume 6.0L, Neutrophils (%) (Auto) 79.2H, Lymphocytes 

(%) (Auto) 10.9L, Monocytes (%) (Auto) 7.7, Eosinophils (%) (Auto) 0.9, 

Basophils (%) (Auto) 1.3, Sodium Level 141, Potassium Level 2.9L, Chloride 

Level 104, Carbon Dioxide Level 29, Anion Gap 8, Blood Urea Nitrogen 7, 

Creatinine 1.0, Estimat Glomerular Filtration Rate > 60, Glucose Level 165H, 

Calcium Level 7.8L, Magnesium Level 1.3L


Height (Feet):  5


Height (Inches):  3.00


Weight (Pounds):  171


Objective


General Appearance:  alert, no acute distress


Vitals:  Have been reviewed. 


Lines, tubes and drains:  peripheral


HEENT:  normocephalic, atraumatic


Neck:  non-tender, normal alignment, supple, normal inspection


Respiratory/Chest:  chest wall non-tender, lungs clear, normal breath sounds, 

no respiratory distress, no accessory muscle use


Cardiovascular/Chest:  regular rhythm, no gallop/murmur, tachycardia


Abdomen:  normal bowel sounds, non tender, soft, no organomegaly, no mass


Extremities:  other - right calf TTP+, painful to active motion


Skin Exam:  other - right calf with cellulitis/erythema, blistering. +++ r leg 

abscess











Quintin Yeh MD Jan 2, 2019 21:32

## 2019-01-02 NOTE — NUR
NURSE NOTES:

Received report from Sonya DYE. Pt in stable condition w/ no distress or discomfort. Call light 
within reach, bedside table within reach, bed in low and locked position. continue to 
monitor.

## 2019-01-02 NOTE — GENERAL PROGRESS NOTE
Assessment/Plan


Problem List:  


(1) MRSA (methicillin resistant Staphylococcus aureus) septicemia


Assessment & Plan:  wound cx mrsa


vanco


abx per ID, appreciate recs


ICD Codes:  A41.02 - Sepsis due to Methicillin resistant Staphylococcus aureus


SNOMED:  06775746, 622671116


(2) Severe sepsis


Assessment & Plan:  wbc 25, tachy with  and temp of 102 on admit


CT done in the ED reviewed and showing concerns for infected hematoma


wbc still elevated, improving slowly..  concerns for infection behind knee down 

lower leg.. pending repeat CT LE 


appreciate surgery eval


s/p I&D 12/26/18 on admit 


dressings c/d/i


cont abx per ID


wound cx MRSA


cont ivf


cont pain control IV morphine


ICD Codes:  A41.9 - Sepsis, unspecified organism; R65.20 - Severe sepsis 

without septic shock


SNOMED:  90777412


(3) Abscess of leg, right


Assessment & Plan:  cont abx per above


cx sent


s/p I&D


ICD Codes:  L02.415 - Cutaneous abscess of right lower limb; R65.20 - Severe 

sepsis without septic shock


SNOMED:  553055062


(4) Cellulitis


Assessment & Plan:  cont abx


monitor closely


ICD Codes:  L03.90 - Cellulitis, unspecified


SNOMED:  292230881


Qualifiers:  


   


(5) Lactic acid acidosis


Assessment & Plan:  due to severe sepsis


LA 2.1


recheck LA 


IVF


ICD Codes:  E87.2 - Acidosis


SNOMED:  42925625


(6) Uncontrolled diabetes mellitus


Assessment & Plan:  iss


inc levimir to 14


monitor, adjust accordingly 


accuchecks


hgba1c


ICD Codes:  E11.65 - Type 2 diabetes mellitus with hyperglycemia


SNOMED:  42005795, 961850841


Qualifiers:  


   Qualified Codes:  E11.65 - Type 2 diabetes mellitus with hyperglycemia


(7) Essential hypertension


Assessment & Plan:  hydralazine prn


start lisinopril 20mg qday


monitor closely


likely exacerbated due to pain


ICD Codes:  I10 - Essential (primary) hypertension


SNOMED:  47428776


(8) Uncontrolled hypertension


Assessment & Plan:  hydralazine prn


started acei


monitor closely


ICD Codes:  I10 - Essential (primary) hypertension


SNOMED:  36938673, 52889266


(9) Hyponatremia


Assessment & Plan:  Na 130 on admit


improving with fluids, now Na 133


fluids


should improved


likely due to dehydration


ICD Codes:  E87.1 - Hypo-osmolality and hyponatremia


SNOMED:  38041595


Status:  stable


Assessment/Plan


DVT Prophylaxis:   SCD, HSQ


Code Status:            Full


I have personally reviewed all labs, medications and imaging





Hospital Classification Declaration: Based on this initial evaluation, and 

depending on the patient's clinical course, I anticipate that this patient will 

require hospitalization for [2-3 midnights for severe sepsis/right leg abscess 

likely requiring surgery and close respiratory/hemodynamic monitoring.





Disposition: Once the patient is stable to leave the hospital, I anticipate the 

patient will likely be discharged to the following environment: home with  vs 

SNF





I spent 54 minutes on this patient's case, and 40 minutes were dedicated to 

counseling and/or care coordination. Discussed with patient/family, nursing 

staff, SW/CM, and consultants regarding clinical status, treatment course, and 

disposition planning.





Time of note may not reflect time of encounter.


------





Date of Discussion: 12/27/18 





A face-to-face discussion with the patient regarding the patient's advanced 

care planning took place during this hospitalization on the above date. The 

discussion included the explanation and discussion of advance directives and 

associated forms/documents, as well as the patient's current code status. We 

also discussed at length the patient's medical conditions (both acute and 

chronic), general prognosis, treatment options, and goals of care. The 

following summarizes the discussion:





Advance Care Planning/Goals of Care:


- Will attempt to fill out an AD and/or POLST with the patient prior to 

discharge, if not already completed


- Continue current evaluation and management of any acute and chronic medical 

issues


- Will continue to support the patient/family


- Will continue to discuss both short- and long-term goals of care





DPOA-HC/Surrogate Decision Maker:


None currently appointed 





Code Status:


Full Code 





AD Forms/Documents Completed:


Deferred 





A total of 31 minutes was spent on this discussion, including counseling, 

answering questions, and completing, if any, pertinent advanced care planning 

forms/documents.





Subjective


Date patient seen:  Jan 2, 2019


Time patient seen:  07:02


Allergies:  


Coded Allergies:  


     No Known Allergies (Unverified , 12/26/18)


Subjective


f/u right leg MRSA abscess/infected hematoma s/p I&D, severe sepsis





s/p I&D on admit 12/26/18


still having pain in the area


dressing in place


planned for repeat CT of the lower leg as prev CT was of femur.. may need 

repeat I&D per discussion with surgeon 


no acute events overnight


no cp or sob, no diarrhea/constipation











ROS: 14 point ROS negative except per the above subjective





Objective





Last 24 Hour Vital Signs








  Date Time  Temp Pulse Resp B/P (MAP) Pulse Ox O2 Delivery O2 Flow Rate FiO2


 


1/2/19 04:00 97.3 70 22 137/87 (104) 91   


 


1/2/19 04:00  83      


 


1/2/19 00:00 99.0 93 20 144/78 (100) 96   


 


1/1/19 21:00      Room Air  


 


1/1/19 20:00 99.7 93 19 147/78 (101) 96   


 


1/1/19 20:00  95      


 


1/1/19 16:00  88      


 


1/1/19 16:00 96.8 89 20 122/58 (79) 97   


 


1/1/19 12:00 98.6 95 20 161/79 (106) 98   


 


1/1/19 12:00  88      


 


1/1/19 09:03    144/78    


 


1/1/19 08:00 98.1 96 20 144/78 (100) 99   


 


1/1/19 08:00      Room Air  


 


1/1/19 08:00  99      

















Intake and Output  


 


 1/1/19 1/2/19





 19:00 07:00


 


Intake Total 680 ml 300 ml


 


Output Total 1100 ml 2600 ml


 


Balance -420 ml -2300 ml


 


  


 


Intake Oral 680 ml 300 ml


 


Output Urine Total 1100 ml 2600 ml


 


# Bowel Movements 1 








Height (Feet):  5


Height (Inches):  3.00


Weight (Pounds):  171


Objective





General Appearance:  alert, mild distress


Lines, tubes and drains:  peripheral


HEENT:  normocephalic, atraumatic


Neck:  non-tender, normal alignment, supple, normal inspection


Respiratory/Chest:  chest wall non-tender, lungs clear, normal breath sounds, 

no respiratory distress, no accessory muscle use


Cardiovascular/Chest:  regular rhythm, no gallop/murmur, tachycardia


Abdomen:  normal bowel sounds, non tender, soft, no organomegaly, no mass


Extremities:  right leg dressing c/d/i, painful to active motion as expected


Skin Exam:  other - right calf with cellulitis/erythema, blistering.


Neurologic:  CNs II-XII grossly normal, no motor/sensory deficits, alert, 

oriented x 3, responsive, normal mood/affect











Celi Kilpatrick MD Jan 2, 2019 07:04

## 2019-01-02 NOTE — INFECTIOUS DISEASES PROG NOTE
Assessment/Plan


Assessment/Plan


Sepsis 2ry infected R leg/ infected hematoma/ abscess/necrotic myositis


   -12/26 SP I+D


             --OR findings: there was a large foul-smelling abscess evacuated.  

The abscess was evacuated and fascia was identified with edema but no necrosis.

  The fascia was incised and the muscle was identified.  There was some areas 

of muscle necrosis, which required debridement. The remainder of the extremity 

was significantly edematous, but without other identifiable abscess.


   -CT leg: Fluid collection in the soft tissues of the right lateral upper 

calf which may represent evolution of a prior intramuscular hematoma, 

especially given history of remote trauma. There is concern for superimposed 

infection/abscess, especially given skin thickening and subcutaneous stranding 

suggestive of a cellulitis. Correlate clinically.





 - 12/26 OR Cx - MRSA 


 


Fever, improving


Leukocytosis, improving





DM





Plan:


-Continue empiric IV Vancomycin d# 5 


    Will switch to Bactrim on D/C





     - 12/29/18 SP Zosyn d# 3 





-Monitor CBC/CMP, temperatures


-wound care


-Sx f.u


- monitor I and D site given continued leukocytosis





Subjective


Allergies:  


Coded Allergies:  


     No Known Allergies (Unverified , 12/26/18)


Subjective


Patient afebrile


Leukocytosis slowly resolving


Pain controlled


Serosanguineous drainage from I and D site





Objective


Vital Signs





Last 24 Hour Vital Signs








  Date Time  Temp Pulse Resp B/P (MAP) Pulse Ox O2 Delivery O2 Flow Rate FiO2


 


1/2/19 04:00 97.3 70 22 137/87 (104) 91   


 


1/2/19 04:00  83      


 


1/2/19 00:00 99.0 93 20 144/78 (100) 96   


 


1/1/19 21:00      Room Air  


 


1/1/19 20:00 99.7 93 19 147/78 (101) 96   


 


1/1/19 20:00  95      


 


1/1/19 16:00  88      


 


1/1/19 16:00 96.8 89 20 122/58 (79) 97   


 


1/1/19 12:00 98.6 95 20 161/79 (106) 98   


 


1/1/19 12:00  88      


 


1/1/19 09:03    144/78    








Height (Feet):  5


Height (Inches):  3.00


Weight (Pounds):  171


Objective


General:  NAD


HEENT:  normocephalic, atraumatic, MMM, EOMI


Respiratory/Chest:  CTAB, No W


Cardiovascular/Chest:  regular rhythm, No M/R/G


Abdomen:  normal bowel sounds, non tender, soft, no organomegaly, no mass


Extremities:  right leg dressing with some staining on the dressing





Current Medications








 Medications


  (Trade)  Dose


 Ordered  Sig/Shae


 Route


 PRN Reason  Start Time


 Stop Time Status Last Admin


Dose Admin


 


 Acetaminophen


  (Tylenol)  650 mg  Q4H  PRN


 ORAL


 Mild Pain/Temp > 100.5  12/27/18 20:45


 1/26/19 20:44  12/30/18 09:12


 


 


 Al Hydroxide/Mg


 Hydroxide


  (Mylanta II)  30 ml  Q6H  PRN


 ORAL


 dyspepsia  12/26/18 18:45


 1/25/19 18:44   


 


 


 Dextrose


  (Dextrose 50%)  25 ml  Q30M  PRN


 IV


 Hypoglycemia  12/29/18 16:15


 1/28/19 16:14   


 


 


 Dextrose


  (Dextrose 50%)  50 ml  Q30M  PRN


 IV


 Hypoglycemia  12/29/18 16:15


 1/28/19 16:14   


 


 


 Diphenhydramine


 HCl


  (Benadryl)  12.5 mg  Q6H  PRN


 IVP


 Itching/Pruritis  12/26/18 21:45


 1/25/19 21:44   


 


 


 Docusate Sodium


  (Colace)  100 mg  EVERY 12  HOURS


 ORAL


   12/26/18 21:00


 1/25/19 20:59  1/1/19 08:39


 


 


 Famotidine


  (Pepcid)  40 mg  DAILY


 ORAL


   12/27/18 09:00


 1/26/19 08:59  1/1/19 08:39


 


 


 Hydralazine HCl


  (Apresoline)  10 mg  Q6H  PRN


 ORAL


 hypertension  12/26/18 20:00


 1/25/19 19:59   


 


 


 Hydromorphone HCl


  (Dilaudid)  0.5 mg  Q3H  PRN


 IVP


 For Pain  12/26/18 18:45


 1/2/19 18:44  1/1/19 11:17


 


 


 Hydromorphone HCl


  (Dilaudid)  1 mg  Q3H  PRN


 IVP


 Moderate Pain (Pain Scale 4-6)  12/26/18 18:45


 1/2/19 18:44  1/1/19 23:07


 


 


 Hydromorphone HCl


  (Dilaudid)  2 mg  Q3H  PRN


 IVP


 Severe Pain (Pain Scale 7-10)  12/26/18 18:45


 1/2/19 18:44  12/29/18 21:04


 


 


 Ibuprofen


  (Motrin)  600 mg  THREE TIMES A DAY  PRN


 ORAL


 Fever/Headache/Mild Pain  12/29/18 17:30


 1/28/19 17:29  12/31/18 09:49


 


 


 Insulin Aspart


  (NovoLOG)    BEFORE MEALS AND  HS


 SUBQ


   12/29/18 16:30


 1/28/19 16:29  1/1/19 21:50


 


 


 Insulin Detemir


  (Levemir)  18 units  QHS


 SUBQ


   12/31/18 21:00


 1/27/19 20:59  1/1/19 21:50


 


 


 Lisinopril


  (Prinivil)  20 mg  DAILY


 ORAL


   12/27/18 09:00


 1/26/19 08:59  1/1/19 09:03


 


 


 Lorazepam


  (Ativan 2mg/ml


 1ml)  0.5 mg  Q4H  PRN


 IV


 For Anxiety  12/26/18 18:45


 1/2/19 18:44   


 


 


 Magnesium


 Hydroxide


  (Mom)  30 ml  BIDPRN  PRN


 ORAL


 Constipation  12/26/18 21:45


 1/25/19 21:44   


 


 


 Ondansetron HCl


  (Zofran)  4 mg  Q6H  PRN


 IVP


 Nausea & Vomiting  12/26/18 18:45


 1/25/19 18:44   


 


 


 Sodium Chloride  1,000 ml @ 


 125 mls/hr  Q8H


 IV


   12/28/18 16:44


 1/27/19 16:43  1/2/19 00:44


 


 


 Temazepam


  (Restoril)  7.5 mg  DAILYPRN  PRN


 ORAL


 Insomnia  12/26/18 21:45


 1/2/19 21:44   


 


 


 Vancomycin HCl


  (Vanco rx to


 dose)  1 ea  DAILY  PRN


 MISC


 Per rx protocol  12/26/18 20:00


 1/25/19 19:59   


 


 


 Vancomycin HCl/


 Dextrose  250 ml @ 


 167 mls/hr  Q12H


 IVPB


   1/1/19 18:30


 1/6/19 18:29  1/2/19 06:01


 

















Shekhar Vigil MD Jan 2, 2019 08:45

## 2019-01-02 NOTE — DIAGNOSTIC IMAGING REPORT
Indication:  Right Leg pain and erythema swelling. Status post incision and drainage

of the abscess in the right calf.  foot swelling.

 

Technique: Continuous helical imaging of the right lower extremity from the knee

through the foot performed in the transaxial plane after intravenous contrast

administration. Coronal 2-D reformatted images were also generated. Study obtained in

a Siemens Sensation 64 slice CT. 

 

total DLP: 715 mGycm

 

CTD/vol: 0.15, 6.27, 15.26 mGy

 

Comparison:  CT femur 12/26/2018

 

Findings:  

 

The previous CT femur partially demonstrated a prominent fluid collection along the

posterior lateral aspect of the right calf just below the knee. Current examination

demonstrates a surgical defect reflective of the recent incision and drainage of this

collection, the results of which are unknown to us. Subcutaneous edema adjacent to

the incision and small amounts of fluid and trace air are noted indicative of the

recent surgery. However there is no large residual collection identified. No obvious

interfascial enhancement or intracompartmental collections identified. 

 

There is generalized subcutaneous edema of the foot as well initially along the

dorsal aspect of the foot. There is no obvious abscess or fluid collection

appreciated.

 

IMPRESSION:

 

Postsurgical changes associated with recent incision and drainage in the

posterolateral aspect of the right upper calf for drainage of a fluid collection. No

significant residual collection identified.

 

 

 

The CT scanner at [facility] is accredited by the [employer] College of

Radiology and the scans are performed using dose optimization techniques as

appropriate to a performed exam including Automatic Exposure control.

## 2019-01-02 NOTE — GENERAL PROGRESS NOTE
Assessment/Plan


Assessment/Plan


# Leukocytosis. Secondary to underlying infection, sepsis, cellulitis. leg 

abscess s/p drainage


--> Peripheral has been ordered, results are pending 


--> Medications have been reviewed 


--> Imaging has been reviewed. CXR shows no acute disease. 


--> has been started on abx, empiric treatment 


# Anemia of chronic disease due to underlying chronic medical issues, 

multifactorial. 


--> Current Hgb decreased, have reviewed w/u


--> Monitor for stability 


# Sepsis. ID is follwoing, appreciate recs. 


--> On abx. 


--> IVF 


# Abscess of R leg. Surgery is following, appreciate recs. 


--> S/P I&D


--> abx 


--> CT done in the ED reviewed and showing concerns for infected hematoma


# Cellulitis


# Lactic acid acidosis. Due to severe sepsis. 


# DM


# HTN. 


# DVT prophylaxis. 








GREATLY APPRECIATE CONSULTATION. 





Date and time note is entered does not necessarily reflect the time of 

encounter.





Subjective


Constitutional:  Denies: no symptoms, chills, diaphoresis, fever, malaise, 

weakness, other


HEENT:  Denies: no symptoms, eye pain, blurred vision, tearing, double vision, 

ear pain, ear discharge, nose pain, nose congestion, throat pain, throat 

swelling, mouth pain, mouth swelling, other


Cardiovascular:  Denies: no symptoms, chest pain, edema, irregular heart rate, 

lightheadedness, palpitations, syncope, other


Respiratory:  Denies: no symptoms, cough, orthopnea, shortness of breath, SOB 

with excertion, SOB at rest, sputum, stridor, wheezing, other


Gastrointestinal/Abdominal:  Denies: no symptoms, abdomen distended, abdominal 

pain, black stools, tarry stools, blood in stool, constipated, diarrhea, 

difficulty swallowing, nausea, poor appetite, poor fluid intake, rectal bleeding

, vomiting, other


Genitourinary:  Denies: no symptoms, burning, discharge, frequency, flank pain, 

hematuria, incontinence, pain, urgency, other


Neurologic/Psychiatric:  Denies: no symptoms, anxiety, depressed, emotional 

problems, headache, numbness, paresthesia, pre-existing deficit, seizure, 

tingling, tremors, weakness, other


Endocrine:  Denies: no symptoms, excessive sweating, flushing, intolerance to 

cold, intolerance to heat, increased hunger, increased thirst, increased urine, 

unexplained weight gain, unexplained weight loss, other


Hematologic/Lymphatic:  Denies: no symptoms, anemia, easy bleeding, easy 

bruising, other


Allergies:  


Coded Allergies:  


     No Known Allergies (Unverified , 12/26/18)


Subjective


12/29: Pt awake and alert. No acute events. WBC remains elevated, remains on 

abx. Afebrile. H/H stable. 


12/30: k was low, has drainage of wound, seen by surg, BS still high, being 

adjusted iss


1/1: feeling better, overall, s/o drainage of right leg abscess, monitoring 

counts, no fevers


1/2 Pt is awake and alert, resting in bed. Wbc remains elevated, improving. no 

fevers or chills.





Objective





Last 24 Hour Vital Signs








  Date Time  Temp Pulse Resp B/P (MAP) Pulse Ox O2 Delivery O2 Flow Rate FiO2


 


1/2/19 20:00 98.6 86 20 140/74 (96) 97   


 


1/2/19 20:00  90      


 


1/2/19 19:00      Room Air  


 


1/2/19 16:00 98.4 84 20 138/76 (96) 97   


 


1/2/19 15:35  76      


 


1/2/19 12:00 97.5 86 19 154/78 (103) 97   


 


1/2/19 11:15  87      


 


1/2/19 09:11    147/77    


 


1/2/19 09:00      Room Air  


 


1/2/19 08:00 98.2 89 21 147/77 (100) 98   


 


1/2/19 07:16  85      


 


1/2/19 04:00 97.3 70 22 137/87 (104) 91   


 


1/2/19 04:00  83      


 


1/2/19 00:00 99.0 93 20 144/78 (100) 96   

















Intake and Output  


 


 1/1/19 1/2/19





 19:00 07:00


 


Intake Total 680 ml 300 ml


 


Output Total 1100 ml 2600 ml


 


Balance -420 ml -2300 ml


 


  


 


Intake Oral 680 ml 300 ml


 


Output Urine Total 1100 ml 2600 ml


 


# Bowel Movements 1 








Laboratory Tests


1/2/19 08:00: 


White Blood Count 14.3H, Red Blood Count 3.10L, Hemoglobin 9.2L, Hematocrit 

27.8L, Mean Corpuscular Volume 90, Mean Corpuscular Hemoglobin 29.8, Mean 

Corpuscular Hemoglobin Concent 33.1, Red Cell Distribution Width 11.9, Platelet 

Count 409, Mean Platelet Volume 6.0L, Neutrophils (%) (Auto) 79.2H, Lymphocytes 

(%) (Auto) 10.9L, Monocytes (%) (Auto) 7.7, Eosinophils (%) (Auto) 0.9, 

Basophils (%) (Auto) 1.3, Sodium Level 141, Potassium Level 2.9L, Chloride 

Level 104, Carbon Dioxide Level 29, Anion Gap 8, Blood Urea Nitrogen 7, 

Creatinine 1.0, Estimat Glomerular Filtration Rate > 60, Glucose Level 165H, 

Calcium Level 7.8L, Magnesium Level 1.3L


Height (Feet):  5


Height (Inches):  3.00


Weight (Pounds):  171


Objective


General Appearance:  alert, no acute distress


Vitals:  Have been reviewed. 


Lines, tubes and drains:  peripheral


HEENT:  normocephalic, atraumatic


Neck:  non-tender, normal alignment, supple, normal inspection


Respiratory/Chest:  chest wall non-tender, lungs clear, normal breath sounds, 

no respiratory distress, no accessory muscle use


Cardiovascular/Chest:  regular rhythm, no gallop/murmur, tachycardia


Abdomen:  normal bowel sounds, non tender, soft, no organomegaly, no mass


Extremities:  other - right calf TTP+, painful to active motion


Skin Exam:  other - right calf with cellulitis/erythema, blistering. +++ r leg 

abscess











Quintin Yeh MD Jan 2, 2019 21:38

## 2019-01-02 NOTE — DIAGNOSTIC IMAGING REPORT
Indication:  Right Leg pain and erythema swelling. Status post incision and drainage

of the abscess in the right calf.  foot swelling.

 

Technique: Continuous helical imaging of the right lower extremity from the knee

through the foot performed in the transaxial plane after intravenous contrast

administration. Coronal 2-D reformatted images were also generated. Study obtained in

a Siemens Sensation 64 slice CT. 

 

total DLP: 715 mGycm

 

CTD/vol: 0.15, 6.27, 15.26 mGy

 

Comparison:  CT femur 12/26/2018

 

Findings:  

 

The previous CT femur partially demonstrated a prominent fluid collection along the

posterior lateral aspect of the right calf just below the knee. Current examination

demonstrates a surgical defect reflective of the recent incision and drainage of this

collection, the results of which are unknown to us. Subcutaneous edema adjacent to

the incision and small amounts of fluid and trace air are noted indicative of the

recent surgery. However there is no large residual collection identified. No obvious

interfascial enhancement or intracompartmental collections identified. 

 

There is generalized subcutaneous edema of the foot as well initially along the

dorsal aspect of the foot. There is no obvious abscess or fluid collection

appreciated.

 

IMPRESSION:

 

Postsurgical changes associated with recent incision and drainage in the

posterolateral aspect of the right upper calf for drainage of a fluid collection. No

significant residual collection identified.

 

 

 

The CT scanner at [facility] is accredited by the American College of

Radiology and the scans are performed using dose optimization techniques as

appropriate to a performed exam including Automatic Exposure control.

## 2019-01-02 NOTE — GENERAL PROGRESS NOTE
Progress Note


Progress Note


Surgery:





leukocytosis improved


exam improved


wound dressings changed


CT noted





-cont abx


-transition to oral abx for d/c


-cont wound care


-trend labs











Tony Fox Jan 2, 2019 10:18

## 2019-01-02 NOTE — NUR
NURSE NOTES:



Received patient from HARDIK Ibarra. Patient is in bed, awake, and verbally responsive. On room 
air. No SOB. Respirations are even and unlaboured. Cardiac monitor in placed. Walker in the 
room and per pm nurse, he is able to ambulate to the bathroom. IV site is asymptomatic. Bed 
in lowest position with side rails up. Will continue to follow plan of care.

## 2019-01-03 VITALS — SYSTOLIC BLOOD PRESSURE: 137 MMHG | DIASTOLIC BLOOD PRESSURE: 71 MMHG

## 2019-01-03 VITALS — SYSTOLIC BLOOD PRESSURE: 144 MMHG | DIASTOLIC BLOOD PRESSURE: 68 MMHG

## 2019-01-03 VITALS — SYSTOLIC BLOOD PRESSURE: 132 MMHG | DIASTOLIC BLOOD PRESSURE: 69 MMHG

## 2019-01-03 VITALS — SYSTOLIC BLOOD PRESSURE: 152 MMHG | DIASTOLIC BLOOD PRESSURE: 77 MMHG

## 2019-01-03 VITALS — DIASTOLIC BLOOD PRESSURE: 69 MMHG | SYSTOLIC BLOOD PRESSURE: 131 MMHG

## 2019-01-03 VITALS — DIASTOLIC BLOOD PRESSURE: 70 MMHG | SYSTOLIC BLOOD PRESSURE: 139 MMHG

## 2019-01-03 LAB
ADD MANUAL DIFF: NO
ANION GAP SERPL CALC-SCNC: 6 MMOL/L (ref 5–15)
BASOPHILS NFR BLD AUTO: 2.2 % (ref 0–2)
BUN SERPL-MCNC: 7 MG/DL (ref 7–18)
CALCIUM SERPL-MCNC: 7.9 MG/DL (ref 8.5–10.1)
CHLORIDE SERPL-SCNC: 106 MMOL/L (ref 98–107)
CO2 SERPL-SCNC: 29 MMOL/L (ref 21–32)
CREAT SERPL-MCNC: 1 MG/DL (ref 0.55–1.3)
EOSINOPHIL NFR BLD AUTO: 0.6 % (ref 0–3)
ERYTHROCYTE [DISTWIDTH] IN BLOOD BY AUTOMATED COUNT: 12.2 % (ref 11.6–14.8)
HCT VFR BLD CALC: 24.6 % (ref 42–52)
HGB BLD-MCNC: 8.3 G/DL (ref 14.2–18)
LYMPHOCYTES NFR BLD AUTO: 10.7 % (ref 20–45)
MCV RBC AUTO: 89 FL (ref 80–99)
MONOCYTES NFR BLD AUTO: 8 % (ref 1–10)
NEUTROPHILS NFR BLD AUTO: 78.5 % (ref 45–75)
PLATELET # BLD: 435 K/UL (ref 150–450)
POTASSIUM SERPL-SCNC: 3.2 MMOL/L (ref 3.5–5.1)
RBC # BLD AUTO: 2.75 M/UL (ref 4.7–6.1)
SODIUM SERPL-SCNC: 141 MMOL/L (ref 136–145)
WBC # BLD AUTO: 12.2 K/UL (ref 4.8–10.8)

## 2019-01-03 RX ADMIN — VANCOMYCIN HCL-SODIUM CHLORIDE IV SOLN 1.25 GM/250ML-0.9% SCH MLS/HR: 1.25-0.9/25 SOLUTION at 06:55

## 2019-01-03 RX ADMIN — INSULIN ASPART SCH UNITS: 100 INJECTION, SOLUTION INTRAVENOUS; SUBCUTANEOUS at 06:46

## 2019-01-03 RX ADMIN — LISINOPRIL SCH MG: 20 TABLET ORAL at 08:28

## 2019-01-03 RX ADMIN — DOCUSATE SODIUM SCH MG: 100 CAPSULE, LIQUID FILLED ORAL at 20:46

## 2019-01-03 RX ADMIN — INSULIN ASPART SCH UNITS: 100 INJECTION, SOLUTION INTRAVENOUS; SUBCUTANEOUS at 12:17

## 2019-01-03 RX ADMIN — INSULIN DETEMIR SCH UNITS: 100 INJECTION, SOLUTION SUBCUTANEOUS at 20:47

## 2019-01-03 RX ADMIN — VANCOMYCIN HCL-SODIUM CHLORIDE IV SOLN 1.25 GM/250ML-0.9% SCH MLS/HR: 1.25-0.9/25 SOLUTION at 17:30

## 2019-01-03 RX ADMIN — DOCUSATE SODIUM SCH MG: 100 CAPSULE, LIQUID FILLED ORAL at 08:27

## 2019-01-03 RX ADMIN — INSULIN ASPART SCH UNITS: 100 INJECTION, SOLUTION INTRAVENOUS; SUBCUTANEOUS at 20:48

## 2019-01-03 RX ADMIN — INSULIN ASPART SCH UNITS: 100 INJECTION, SOLUTION INTRAVENOUS; SUBCUTANEOUS at 17:29

## 2019-01-03 NOTE — GENERAL PROGRESS NOTE
Assessment/Plan


Assessment/Plan


# Leukocytosis. Secondary to underlying infection, sepsis, cellulitis. leg 

abscess s/p drainage


--> Peripheral has been ordered, results are pending 


--> Medications have been reviewed 


--> Imaging has been reviewed. CXR shows no acute disease. 


--> has been started on abx, empiric treatment 


# Anemia of chronic disease due to underlying chronic medical issues, 

multifactorial. 


--> Current Hgb decreased, have reviewed w/u


--> Monitor for stability 


# Sepsis. ID is follwoing, appreciate recs. 


--> On abx. 


--> IVF 


# Abscess of R leg. Surgery is following, appreciate recs. 


--> S/P I&D


--> abx 


--> CT done in the ED reviewed and showing concerns for infected hematoma


# Cellulitis 


# Lactic acid acidosis. Due to severe sepsis. 


# DM


# HTN. 


# DVT prophylaxis. 








GREATLY APPRECIATE CONSULTATION. 





Date and time note is entered does not necessarily reflect the time of 

encounter.





Subjective


Constitutional:  Denies: no symptoms, chills, diaphoresis, fever, malaise, 

weakness, other


HEENT:  Denies: no symptoms, eye pain, blurred vision, tearing, double vision, 

ear pain, ear discharge, nose pain, nose congestion, throat pain, throat 

swelling, mouth pain, mouth swelling, other


Cardiovascular:  Denies: no symptoms, chest pain, edema, irregular heart rate, 

lightheadedness, palpitations, syncope, other


Respiratory:  Denies: no symptoms, cough, orthopnea, shortness of breath, SOB 

with excertion, SOB at rest, sputum, stridor, wheezing, other


Gastrointestinal/Abdominal:  Denies: no symptoms, abdomen distended, abdominal 

pain, black stools, tarry stools, blood in stool, constipated, diarrhea, 

difficulty swallowing, nausea, poor appetite, poor fluid intake, rectal bleeding

, vomiting, other


Genitourinary:  Denies: no symptoms, burning, discharge, frequency, flank pain, 

hematuria, incontinence, pain, urgency, other


Neurologic/Psychiatric:  Denies: no symptoms, anxiety, depressed, emotional 

problems, headache, numbness, paresthesia, pre-existing deficit, seizure, 

tingling, tremors, weakness, other


Endocrine:  Denies: no symptoms, excessive sweating, flushing, intolerance to 

cold, intolerance to heat, increased hunger, increased thirst, increased urine, 

unexplained weight gain, unexplained weight loss, other


Hematologic/Lymphatic:  Denies: no symptoms, anemia, easy bleeding, easy 

bruising, other


Allergies:  


Coded Allergies:  


     No Known Allergies (Unverified , 12/26/18)


Subjective


12/29: Pt awake and alert. No acute events. WBC remains elevated, remains on 

abx. Afebrile. H/H stable. 


12/30: k was low, has drainage of wound, seen by surg, BS still high, being 

adjusted iss


1/1: feeling better, overall, s/o drainage of right leg abscess, monitoring 

counts, no fevers


1/2 Pt is awake and alert, resting in bed. Wbc remains elevated, improving. no 

fevers or chills.


1/3 Pt is awake and seen in the room. Wbc is 12 today, no fevers, drainage of 

right leg abscess, on abx





Objective





Last 24 Hour Vital Signs








  Date Time  Temp Pulse Resp B/P (MAP) Pulse Ox O2 Delivery O2 Flow Rate FiO2


 


1/3/19 21:00 98.3 80 18 132/69 (90) 98   


 


1/3/19 21:00  80      


 


1/3/19 15:51 98.8 79 20 139/70 (93) 98   


 


1/3/19 15:39  79      


 


1/3/19 14:02 98.5       


 


1/3/19 12:26  87      


 


1/3/19 11:38 98.5 75 20 144/68 (93) 97   


 


1/3/19 09:00      Room Air  


 


1/3/19 08:28    131/69    


 


1/3/19 08:00  85 20 131/69 (89) 99   


 


1/3/19 07:40  84      


 


1/3/19 04:00 98.4 81 20 137/71 (93) 98   


 


1/3/19 04:00  79      


 


1/3/19 00:00 99.6 82 20 152/77 (102) 98   


 


1/3/19 00:00  85      

















Intake and Output  


 


 1/2/19 1/3/19





 19:00 07:00


 


Intake Total 2135 ml 


 


Output Total 2000 ml 1600 ml


 


Balance 135 ml -1600 ml


 


  


 


Intake Oral 860 ml 


 


IV Total 1275 ml 


 


Output Urine Total 2000 ml 1600 ml


 


# Bowel Movements 2 








Laboratory Tests


1/3/19 05:58: 


White Blood Count 12.2H, Red Blood Count 2.75L, Hemoglobin 8.3L, Hematocrit 

24.6L, Mean Corpuscular Volume 89, Mean Corpuscular Hemoglobin 30.3, Mean 

Corpuscular Hemoglobin Concent 33.9, Red Cell Distribution Width 12.2, Platelet 

Count 435, Mean Platelet Volume 6.0L, Neutrophils (%) (Auto) 78.5H, Lymphocytes 

(%) (Auto) 10.7L, Monocytes (%) (Auto) 8.0, Eosinophils (%) (Auto) 0.6, 

Basophils (%) (Auto) 2.2H, Sodium Level 141, Potassium Level 3.2L, Chloride 

Level 106, Carbon Dioxide Level 29, Anion Gap 6, Blood Urea Nitrogen 7, 

Creatinine 1.0, Estimat Glomerular Filtration Rate > 60, Glucose Level 110H, 

Calcium Level 7.9L, Vancomycin Level Trough 19.6H


Height (Feet):  5


Height (Inches):  3.00


Weight (Pounds):  171


Objective


General Appearance:  alert, no acute distress


Vitals:  Have been reviewed. 


Lines, tubes and drains:  peripheral


HEENT:  normocephalic, atraumatic


Neck:  non-tender, normal alignment, supple, normal inspection


Respiratory/Chest:  chest wall non-tender, lungs clear, normal breath sounds, 

no respiratory distress, no accessory muscle use


Cardiovascular/Chest:  regular rhythm, no gallop/murmur, tachycardia


Abdomen:  normal bowel sounds, non tender, soft, no organomegaly, no mass


Extremities:  other - right calf TTP+, painful to active motion


Skin Exam:  other - right calf with cellulitis/erythema, blistering. +++ r leg 

abscess











Quintin Yeh MD Faheem 3, 2019 22:56

## 2019-01-03 NOTE — INFECTIOUS DISEASES PROG NOTE
Assessment/Plan


Assessment/Plan


Sepsis 2ry infected R leg/ infected hematoma/ abscess/necrotic myositis


   -12/26 SP I+D


             --OR findings: there was a large foul-smelling abscess evacuated.  

The abscess was evacuated and fascia was identified with edema but no necrosis.

  The fascia was incised and the muscle was identified.  There was some areas 

of muscle necrosis, which required debridement. The remainder of the extremity 

was significantly edematous, but without other identifiable abscess.


   -CT leg: Fluid collection in the soft tissues of the right lateral upper 

calf which may represent evolution of a prior intramuscular hematoma, 

especially given history of remote trauma. There is concern for superimposed 

infection/abscess, especially given skin thickening and subcutaneous stranding 

suggestive of a cellulitis. Correlate clinically.





 - 12/26 OR Cx - MRSA 


 


Fever, improving


Leukocytosis, improving





DM





Plan:


-Continue empiric IV Vancomycin d# 6/20 


    Can be switch to Bactrim DS BID on D/C for 10 more days





     - 12/29/18 SP Zosyn d# 3 





-Monitor CBC/CMP, temperatures


-wound care


-Sx f.u


- monitor I and D site given continued leukocytosis





Subjective


Allergies:  


Coded Allergies:  


     No Known Allergies (Unverified , 12/26/18)


Subjective


Patient afebrile


Leukocytosis resolving


Pain controlled





Objective


Vital Signs





Last 24 Hour Vital Signs








  Date Time  Temp Pulse Resp B/P (MAP) Pulse Ox O2 Delivery O2 Flow Rate FiO2


 


1/3/19 08:28    131/69    


 


1/3/19 08:00  85 20 131/69 (89) 99   


 


1/3/19 04:00 98.4 81 20 137/71 (93) 98   


 


1/3/19 04:00  79      


 


1/3/19 00:00 99.6 82 20 152/77 (102) 98   


 


1/3/19 00:00  85      


 


1/2/19 20:00 98.6 86 20 140/74 (96) 97   


 


1/2/19 20:00  90      


 


1/2/19 19:00      Room Air  


 


1/2/19 16:00 98.4 84 20 138/76 (96) 97   


 


1/2/19 15:35  76      


 


1/2/19 12:00 97.5 86 19 154/78 (103) 97   


 


1/2/19 11:15  87      


 


1/2/19 09:11    147/77    


 


1/2/19 09:00      Room Air  








Height (Feet):  5


Height (Inches):  3.00


Weight (Pounds):  171


Objective


General:  NAD


HEENT:  normocephalic, atraumatic, MMM, EOMI


Respiratory/Chest:  CTAB, No W


Cardiovascular/Chest:  regular rhythm, No M/R/G


Extremities:  right leg dressing with minimal staining on the dressing





Laboratory Tests








Test


  1/3/19


05:58


 


White Blood Count


  12.2 K/UL


(4.8-10.8)  H


 


Red Blood Count


  2.75 M/UL


(4.70-6.10)  L


 


Hemoglobin


  8.3 G/DL


(14.2-18.0)  L


 


Hematocrit


  24.6 %


(42.0-52.0)  L


 


Mean Corpuscular Volume 89 FL (80-99)  


 


Mean Corpuscular Hemoglobin


  30.3 PG


(27.0-31.0)


 


Mean Corpuscular Hemoglobin


Concent 33.9 G/DL


(32.0-36.0)


 


Red Cell Distribution Width


  12.2 %


(11.6-14.8)


 


Platelet Count


  435 K/UL


(150-450)


 


Mean Platelet Volume


  6.0 FL


(6.5-10.1)  L


 


Neutrophils (%) (Auto)


  78.5 %


(45.0-75.0)  H


 


Lymphocytes (%) (Auto)


  10.7 %


(20.0-45.0)  L


 


Monocytes (%) (Auto)


  8.0 %


(1.0-10.0)


 


Eosinophils (%) (Auto)


  0.6 %


(0.0-3.0)


 


Basophils (%) (Auto)


  2.2 %


(0.0-2.0)  H


 


Sodium Level


  141 MMOL/L


(136-145)


 


Potassium Level


  3.2 MMOL/L


(3.5-5.1)  L


 


Chloride Level


  106 MMOL/L


()


 


Carbon Dioxide Level


  29 MMOL/L


(21-32)


 


Anion Gap


  6 mmol/L


(5-15)


 


Blood Urea Nitrogen


  7 mg/dL (7-18)


 


 


Creatinine


  1.0 MG/DL


(0.55-1.30)


 


Estimat Glomerular Filtration


Rate > 60 mL/min


(>60)


 


Glucose Level


  110 MG/DL


()  H


 


Calcium Level


  7.9 MG/DL


(8.5-10.1)  L


 


Vancomycin Level Trough


  19.6 ug/mL


(5.0-12.0)  H











Current Medications








 Medications


  (Trade)  Dose


 Ordered  Sig/Shae


 Route


 PRN Reason  Start Time


 Stop Time Status Last Admin


Dose Admin


 


 Acetaminophen


  (Tylenol)  650 mg  Q4H  PRN


 ORAL


 Mild Pain/Temp > 100.5  12/27/18 20:45


 1/26/19 20:44  1/2/19 13:03


 


 


 Al Hydroxide/Mg


 Hydroxide


  (Mylanta II)  30 ml  Q6H  PRN


 ORAL


 dyspepsia  12/26/18 18:45


 1/25/19 18:44   


 


 


 Dextrose


  (Dextrose 50%)  25 ml  Q30M  PRN


 IV


 Hypoglycemia  12/29/18 16:15


 1/28/19 16:14   


 


 


 Dextrose


  (Dextrose 50%)  50 ml  Q30M  PRN


 IV


 Hypoglycemia  12/29/18 16:15


 1/28/19 16:14   


 


 


 Diphenhydramine


 HCl


  (Benadryl)  12.5 mg  Q6H  PRN


 IVP


 Itching/Pruritis  12/26/18 21:45


 1/25/19 21:44   


 


 


 Docusate Sodium


  (Colace)  100 mg  EVERY 12  HOURS


 ORAL


   12/26/18 21:00


 1/25/19 20:59  1/3/19 08:27


 


 


 Famotidine


  (Pepcid)  40 mg  DAILY


 ORAL


   12/27/18 09:00


 1/26/19 08:59  1/3/19 08:28


 


 


 Hydralazine HCl


  (Apresoline)  10 mg  Q6H  PRN


 ORAL


 hypertension  12/26/18 20:00


 1/25/19 19:59   


 


 


 Ibuprofen


  (Motrin)  600 mg  THREE TIMES A DAY  PRN


 ORAL


 Fever/Headache/Mild Pain  12/29/18 17:30


 1/28/19 17:29  12/31/18 09:49


 


 


 Insulin Aspart


  (NovoLOG)    BEFORE MEALS AND  HS


 SUBQ


   12/29/18 16:30


 1/28/19 16:29  1/3/19 06:46


 


 


 Insulin Detemir


  (Levemir)  18 units  QHS


 SUBQ


   12/31/18 21:00


 1/27/19 20:59  1/2/19 20:38


 


 


 Lisinopril


  (Prinivil)  20 mg  DAILY


 ORAL


   12/27/18 09:00


 1/26/19 08:59  1/3/19 08:28


 


 


 Magnesium


 Hydroxide


  (Mom)  30 ml  BIDPRN  PRN


 ORAL


 Constipation  12/26/18 21:45


 1/25/19 21:44   


 


 


 Ondansetron HCl


  (Zofran)  4 mg  Q6H  PRN


 IVP


 Nausea & Vomiting  12/26/18 18:45


 1/25/19 18:44   


 


 


 Sodium Chloride  1,000 ml @ 


 125 mls/hr  Q8H


 IV


   12/28/18 16:44


 1/27/19 16:43  1/3/19 00:38


 


 


 Vancomycin HCl


  (Vanco rx to


 dose)  1 ea  DAILY  PRN


 MISC


 Per rx protocol  12/26/18 20:00


 1/25/19 19:59   


 


 


 Vancomycin HCl/


 Dextrose  250 ml @ 


 167 mls/hr  Q12H


 IVPB


   1/1/19 18:30


 1/6/19 18:29  1/3/19 06:55


 

















Shekhar Vigil MD Faheem 3, 2019 08:36

## 2019-01-03 NOTE — NUR
NURSE NOTES:

I received the patient awake and resting in bed.  Patient alert and oriented x4.  Patient 
does not display any signs of distress or SOB.  Bed in the lowest position and call light 
within reach.  I will continue to monitor the patient and implement care.

## 2019-01-03 NOTE — NUR
CASE MANAGEMENT:REVIEW



1/1/19

SI: SEPSIS. RT LEG ABSCESS W/POSSIBLE NECROTIZING INFECTION

S/P INCISION,DRAINAGE AND DEBRIDEMENT

98.6  95  20  161/79  98% ON RA

WBC+17.6   K-3.1





IS: 

IVF@125/HR

IV VANCOMYCIN Q12HRS

IV ZOSYN Q8HRS

LISINOPRIL  PO QD

HEPARIN SQ Q8HRS

IV DILAUDID Q3HRS PRN

**: TELEMETRY STATUS 

DCP; FROM HOME



1/2/19

SI: SEPSIS. RT LEG ABSCESS W/POSSIBLE NECROTIZING INFECTION

S/P INCISION,DRAINAGE AND DEBRIDEMENT

97.5   86  19  154/78  97% ON RA

K-2.9     WBC+14.3    MAG-1.3



IS: IV MAG SULFATE Q1HRS X4

IVF@125/HR

IV VANCOMYCIN Q12HRS

LISINOPRIL  PO QD

HEPARIN SQ Q8HRS

IV DILAUDID Q3HRS PRN

**: TELEMETRY STATUS 

DCP; FROM HOME



1/3/19

SI: SEPSIS. RT LEG ABSCESS W/POSSIBLE NECROTIZING INFECTION

S/P INCISION,DRAINAGE AND DEBRIDEMENT

98.8   79  20  139/70   98% ON RA



IS: IV VANCOMYCIN Q12HRS

IVF@125/HR

LISINOPRIL  PO QD

HEPARIN SQ Q8HRS

IV DILAUDID Q3HRS PRN

**: TELEMETRY STATUS 

DCP; FROM HOME

## 2019-01-03 NOTE — GENERAL PROGRESS NOTE
Progress Note


Progress Note


surgery





recovering


leukocytosis improving


tolerating diet


edema improved


exam improved


CT noted





okay to d/c 


oral abx 


f/u with PCP as outpatient


cont wound care - packing and dressings BID until wound healed


f/u outpatient wound care











Tony Fox Faheem 3, 2019 13:23

## 2019-01-03 NOTE — GENERAL PROGRESS NOTE
Assessment/Plan


Problem List:  


(1) MRSA (methicillin resistant Staphylococcus aureus) septicemia


Assessment & Plan:  wound cx mrsa


vanco


abx per ID, appreciate recs


ICD Codes:  A41.02 - Sepsis due to Methicillin resistant Staphylococcus aureus


SNOMED:  96891519, 256154088


(2) Severe sepsis


Assessment & Plan:  wbc 25, tachy with  and temp of 102 on admit


CT done in the ED reviewed and showing concerns for infected hematoma


appreciate surgery eval


s/p I&D 12/26/18 on admit 


repeat ct femur noted, no plans for repeat ct


discuss with surg and id today for possible dc today/tomorrow, will likely need 

HH wound care set up as well


dressings c/d/i


cont abx per ID


wound cx MRSA


cont ivf


cont pain control IV morphine


ICD Codes:  A41.9 - Sepsis, unspecified organism; R65.20 - Severe sepsis 

without septic shock


SNOMED:  85379765


(3) Abscess of leg, right


Assessment & Plan:  cont abx per above


cx sent


s/p I&D


ICD Codes:  L02.415 - Cutaneous abscess of right lower limb; R65.20 - Severe 

sepsis without septic shock


SNOMED:  233884614


(4) Cellulitis


Assessment & Plan:  cont abx


monitor closely


ICD Codes:  L03.90 - Cellulitis, unspecified


SNOMED:  760305514


Qualifiers:  


   


(5) Lactic acid acidosis


Assessment & Plan:  due to severe sepsis


resolved


ICD Codes:  E87.2 - Acidosis


SNOMED:  48012529


(6) Uncontrolled diabetes mellitus


Assessment & Plan:  iss


inc levimir to 14


monitor, adjust accordingly 


accuchecks


hgba1c


ICD Codes:  E11.65 - Type 2 diabetes mellitus with hyperglycemia


SNOMED:  46429771, 153688176


Qualifiers:  


   Qualified Codes:  E11.65 - Type 2 diabetes mellitus with hyperglycemia


(7) Essential hypertension


Assessment & Plan:  hydralazine prn


start lisinopril 20mg qday


monitor closely


likely exacerbated due to pain


ICD Codes:  I10 - Essential (primary) hypertension


SNOMED:  14458191


(8) Uncontrolled hypertension


Assessment & Plan:  hydralazine prn


started acei


monitor closely


ICD Codes:  I10 - Essential (primary) hypertension


SNOMED:  80401759, 79044273


(9) Hyponatremia


Assessment & Plan:  Na 130 on admit


improving with fluids, now Na 133


fluids


should improved


likely due to dehydration


ICD Codes:  E87.1 - Hypo-osmolality and hyponatremia


SNOMED:  61476127


Status:  stable


Assessment/Plan


DVT Prophylaxis:   SCD, HSQ


Code Status:            Full


I have personally reviewed all labs, medications and imaging





Hospital Classification Declaration: Based on this initial evaluation, and 

depending on the patient's clinical course, I anticipate that this patient will 

require hospitalization for [2-3 midnights for severe sepsis/right leg abscess 

likely requiring surgery and close respiratory/hemodynamic monitoring.





Disposition: Once the patient is stable to leave the hospital, I anticipate the 

patient will likely be discharged to the following environment: home with  





I spent 45 minutes on this patient's case, and 30 minutes were dedicated to 

counseling and/or care coordination. Discussed with patient/family, nursing 

staff, SW/CM, and consultants regarding clinical status, treatment course, and 

disposition planning.





Time of note may not reflect time of encounter.


------





Date of Discussion: 12/27/18 





A face-to-face discussion with the patient regarding the patient's advanced 

care planning took place during this hospitalization on the above date. The 

discussion included the explanation and discussion of advance directives and 

associated forms/documents, as well as the patient's current code status. We 

also discussed at length the patient's medical conditions (both acute and 

chronic), general prognosis, treatment options, and goals of care. The 

following summarizes the discussion:





Advance Care Planning/Goals of Care:


- Will attempt to fill out an AD and/or POLST with the patient prior to 

discharge, if not already completed


- Continue current evaluation and management of any acute and chronic medical 

issues


- Will continue to support the patient/family


- Will continue to discuss both short- and long-term goals of care





DPOA-HC/Surrogate Decision Maker:


None currently appointed 





Code Status:


Full Code 





AD Forms/Documents Completed:


Deferred 





A total of 31 minutes was spent on this discussion, including counseling, 

answering questions, and completing, if any, pertinent advanced care planning 

forms/documents.





Subjective


Date patient seen:  Faheem 3, 2019


Time patient seen:  07:47


Allergies:  


Coded Allergies:  


     No Known Allergies (Unverified , 12/26/18)


Subjective


f/u right leg MRSA abscess/infected hematoma s/p I&D, severe sepsis





s/p I&D on admit 12/26/18


still having pain in the area but denies fevers/chills


dressing in place


repeat ct noted, no plans for repeat i&d per surgery


no acute events overnight


no cp or sob, no diarrhea/constipation











ROS: 14 point ROS negative except per the above subjective





Objective





Last 24 Hour Vital Signs








  Date Time  Temp Pulse Resp B/P (MAP) Pulse Ox O2 Delivery O2 Flow Rate FiO2


 


1/3/19 04:00 98.4 81 20 137/71 (93) 98   


 


1/3/19 04:00  79      


 


1/3/19 00:00 99.6 82 20 152/77 (102) 98   


 


1/3/19 00:00  85      


 


1/2/19 20:00 98.6 86 20 140/74 (96) 97   


 


1/2/19 20:00  90      


 


1/2/19 19:00      Room Air  


 


1/2/19 16:00 98.4 84 20 138/76 (96) 97   


 


1/2/19 15:35  76      


 


1/2/19 12:00 97.5 86 19 154/78 (103) 97   


 


1/2/19 11:15  87      


 


1/2/19 09:11    147/77    


 


1/2/19 09:00      Room Air  


 


1/2/19 08:00 98.2 89 21 147/77 (100) 98   

















Intake and Output  


 


 1/2/19 1/3/19





 19:00 07:00


 


Intake Total 2135 ml 


 


Output Total 2000 ml 1600 ml


 


Balance 135 ml -1600 ml


 


  


 


Intake Oral 860 ml 


 


IV Total 1275 ml 


 


Output Urine Total 2000 ml 1600 ml


 


# Bowel Movements 2 








Laboratory Tests


1/2/19 08:00: 


White Blood Count 14.3H, Red Blood Count 3.10L, Hemoglobin 9.2L, Hematocrit 

27.8L, Mean Corpuscular Volume 90, Mean Corpuscular Hemoglobin 29.8, Mean 

Corpuscular Hemoglobin Concent 33.1, Red Cell Distribution Width 11.9, Platelet 

Count 409, Mean Platelet Volume 6.0L, Neutrophils (%) (Auto) 79.2H, Lymphocytes 

(%) (Auto) 10.9L, Monocytes (%) (Auto) 7.7, Eosinophils (%) (Auto) 0.9, 

Basophils (%) (Auto) 1.3, Sodium Level 141, Potassium Level 2.9L, Chloride 

Level 104, Carbon Dioxide Level 29, Anion Gap 8, Blood Urea Nitrogen 7, 

Creatinine 1.0, Estimat Glomerular Filtration Rate > 60, Glucose Level 165H, 

Calcium Level 7.8L, Magnesium Level 1.3L


1/3/19 05:58: 


White Blood Count 12.2H, Red Blood Count 2.75L, Hemoglobin 8.3L, Hematocrit 

24.6L, Mean Corpuscular Volume 89, Mean Corpuscular Hemoglobin 30.3, Mean 

Corpuscular Hemoglobin Concent 33.9, Red Cell Distribution Width 12.2, Platelet 

Count 435, Mean Platelet Volume 6.0L, Neutrophils (%) (Auto) 78.5H, Lymphocytes 

(%) (Auto) 10.7L, Monocytes (%) (Auto) 8.0, Eosinophils (%) (Auto) 0.6, 

Basophils (%) (Auto) 2.2H, Sodium Level 141, Potassium Level 3.2L, Chloride 

Level 106, Carbon Dioxide Level 29, Anion Gap 6, Blood Urea Nitrogen 7, 

Creatinine 1.0, Estimat Glomerular Filtration Rate > 60, Glucose Level 110H, 

Calcium Level 7.9L, Vancomycin Level Trough 19.6H


Height (Feet):  5


Height (Inches):  3.00


Weight (Pounds):  171


Objective





General Appearance:  alert, mild distress


Lines, tubes and drains:  peripheral


HEENT:  normocephalic, atraumatic


Neck:  non-tender, normal alignment, supple, normal inspection


Respiratory/Chest:  chest wall non-tender, lungs clear, normal breath sounds, 

no respiratory distress, no accessory muscle use


Cardiovascular/Chest:  regular rhythm, no gallop/murmur, tachycardia


Abdomen:  normal bowel sounds, non tender, soft, no organomegaly, no mass


Extremities:  right leg dressing c/d/i, painful to active motion as expected


Skin Exam:  other - right calf with cellulitis/erythema, blistering.


Neurologic:  CNs II-XII grossly normal, no motor/sensory deficits, alert, 

oriented x 3, responsive, normal mood/affect











Celi Kilpatrick MD Faheem 3, 2019 07:50

## 2019-01-03 NOTE — NUR
NURSE NOTES:

Received report from HARDIK Berry. Patient is in bed resting with no signs of acute 
distress. Vitals signs stable, respiration even and non labored on room air. Cardiac monitor 
shows sinus rhythm. Patient denies pain. Call light within reach. Bed in lowest position. 
Will continue to monitor.

## 2019-01-03 NOTE — NUR
RD ASSESSMENT & RECOMMENDATIONS

SEE CARE ACTIVITY FOR COMPLETE ASSESSMENT



DAILY ESTIMATED NEEDS:

Needs based on DM, sepsis, surgery 58kg adj  

25-35  kcals/kg 

1812-8742  total kcals

1.25-2  g protein/kg

  g total protein 

25-30  mL/kg

2441-7157  total fluid mLs



NUTRITION DIAGNOSIS:

1) Increased protein needs r/t sepsis, wound healing as evidenced by pt w/

critically elev WBC, s/p debridement of necrotic muscle.

2) Altered nutrition related lab values r/t diabetes as evidenced by pt

adm w/ Uglu 4+, A1C >12, elev BG (299 362 -> 110 165), POC glu ().





CURRENT DIET:CCHO LOW    





PO DIET RECOMMENDATIONS:

CCHO LOW W/ DOUBLE PROTEIN PORTIONS  







ADDITIONAL RECOMMENDATIONS:

1) wound care: GOKUL BID + MVI x1 + VIT C 500mg BID  

2) Monitor lytes daily, replete as needed - low K and mag 

3) Re-attempt diet edu on f/up- pt refusing at this time 

.

## 2019-01-04 VITALS — DIASTOLIC BLOOD PRESSURE: 79 MMHG | SYSTOLIC BLOOD PRESSURE: 149 MMHG

## 2019-01-04 VITALS — SYSTOLIC BLOOD PRESSURE: 132 MMHG | DIASTOLIC BLOOD PRESSURE: 69 MMHG

## 2019-01-04 VITALS — DIASTOLIC BLOOD PRESSURE: 79 MMHG | SYSTOLIC BLOOD PRESSURE: 141 MMHG

## 2019-01-04 VITALS — SYSTOLIC BLOOD PRESSURE: 140 MMHG | DIASTOLIC BLOOD PRESSURE: 71 MMHG

## 2019-01-04 LAB
ADD MANUAL DIFF: NO
ANION GAP SERPL CALC-SCNC: 8 MMOL/L (ref 5–15)
BASOPHILS NFR BLD AUTO: 1.9 % (ref 0–2)
BUN SERPL-MCNC: 8 MG/DL (ref 7–18)
CALCIUM SERPL-MCNC: 8.4 MG/DL (ref 8.5–10.1)
CHLORIDE SERPL-SCNC: 104 MMOL/L (ref 98–107)
CO2 SERPL-SCNC: 28 MMOL/L (ref 21–32)
CREAT SERPL-MCNC: 1 MG/DL (ref 0.55–1.3)
EOSINOPHIL NFR BLD AUTO: 0.7 % (ref 0–3)
ERYTHROCYTE [DISTWIDTH] IN BLOOD BY AUTOMATED COUNT: 11.9 % (ref 11.6–14.8)
HCT VFR BLD CALC: 26.7 % (ref 42–52)
HGB BLD-MCNC: 8.9 G/DL (ref 14.2–18)
LYMPHOCYTES NFR BLD AUTO: 11.7 % (ref 20–45)
MCV RBC AUTO: 90 FL (ref 80–99)
MONOCYTES NFR BLD AUTO: 6.7 % (ref 1–10)
NEUTROPHILS NFR BLD AUTO: 79 % (ref 45–75)
PLATELET # BLD: 472 K/UL (ref 150–450)
POTASSIUM SERPL-SCNC: 3.4 MMOL/L (ref 3.5–5.1)
RBC # BLD AUTO: 2.95 M/UL (ref 4.7–6.1)
SODIUM SERPL-SCNC: 140 MMOL/L (ref 136–145)
WBC # BLD AUTO: 10.9 K/UL (ref 4.8–10.8)

## 2019-01-04 RX ADMIN — LISINOPRIL SCH MG: 20 TABLET ORAL at 08:19

## 2019-01-04 RX ADMIN — VANCOMYCIN HCL-SODIUM CHLORIDE IV SOLN 1.25 GM/250ML-0.9% SCH MLS/HR: 1.25-0.9/25 SOLUTION at 07:25

## 2019-01-04 RX ADMIN — DOCUSATE SODIUM SCH MG: 100 CAPSULE, LIQUID FILLED ORAL at 08:19

## 2019-01-04 RX ADMIN — INSULIN ASPART SCH UNITS: 100 INJECTION, SOLUTION INTRAVENOUS; SUBCUTANEOUS at 06:30

## 2019-01-04 RX ADMIN — INSULIN ASPART SCH UNITS: 100 INJECTION, SOLUTION INTRAVENOUS; SUBCUTANEOUS at 11:30

## 2019-01-04 NOTE — NUR
NURSE NOTES:

Pharmacy across the street asked the RN to confirm with Dr. Kilpatrick about the frequency of 
glucometer checks. Per Dr. Kilpatrick - HAYLEE and fasting too. RN endorsed to Bri from 
Pharmacy.

## 2019-01-04 NOTE — NUR
HAND-OFF: 

Report given to HARDIK Rosen. Patient is awake with no signs of distress. Vital signs stable.

## 2019-01-04 NOTE — NUR
SS note

Chart reviewed; no SW needs identified at this time. Patient plans to discharge to home with 
home care to follow.

## 2019-01-04 NOTE — INFECTIOUS DISEASES PROG NOTE
Assessment/Plan


Assessment/Plan


Sepsis 2ry infected R leg/ infected hematoma/ abscess/necrotic myositis


   -12/26 SP I+D


             --OR findings: there was a large foul-smelling abscess evacuated.  

The abscess was evacuated and fascia was identified with edema but no necrosis.

  The fascia was incised and the muscle was identified.  There was some areas 

of muscle necrosis, which required debridement. The remainder of the extremity 

was significantly edematous, but without other identifiable abscess.


   -CT leg: Fluid collection in the soft tissues of the right lateral upper 

calf which may represent evolution of a prior intramuscular hematoma, 

especially given history of remote trauma. There is concern for superimposed 

infection/abscess, especially given skin thickening and subcutaneous stranding 

suggestive of a cellulitis. Correlate clinically.





 - 12/26 OR Cx - MRSA 


 


Fever, improving


Leukocytosis, improving





DM





Plan:


-Continue empiric IV Vancomycin d# 7/17


    Can be switch to Bactrim DS BID on D/C for 10 more days





     - 12/29/18 SP Zosyn d# 3 





-Monitor CBC/CMP, temperatures


-wound care


-Sx f.u


- monitor I and D site given continued leukocytosis





Subjective


Allergies:  


Coded Allergies:  


     No Known Allergies (Unverified , 12/26/18)


Subjective


JOSÉ


Leukocytosis resolving


Pain controlled





Objective


Vital Signs





Last 24 Hour Vital Signs








  Date Time  Temp Pulse Resp B/P (MAP) Pulse Ox O2 Delivery O2 Flow Rate FiO2


 


1/4/19 08:19    140/71    


 


1/4/19 04:00  80      


 


1/4/19 04:00 98.7 93 18 132/69 (90) 96   


 


1/4/19 00:00 98.6 75 19 149/79 (102) 98   


 


1/4/19 00:00  72      


 


1/3/19 21:00 98.3 80 18 132/69 (90) 98   


 


1/3/19 21:00      Room Air  


 


1/3/19 20:00  80      


 


1/3/19 15:51 98.8 79 20 139/70 (93) 98   


 


1/3/19 15:39  79      


 


1/3/19 14:02 98.5       


 


1/3/19 12:26  87      


 


1/3/19 11:38 98.5 75 20 144/68 (93) 97   


 


1/3/19 09:00      Room Air  








Height (Feet):  5


Height (Inches):  3.00


Weight (Pounds):  171


Objective


General:  NAD


HEENT:  NCAT, MMM, EOMI


Respiratory/Chest:  CTAB, No W


Cardiovascular/Chest:  regular rhythm, No M/R/G


Extremities:  right leg dressing with minimal staining on the dressing





Laboratory Tests








Test


  1/4/19


07:00 1/4/19


07:50


 


Stool Occult Blood Pending   


 


White Blood Count


  


  10.9 K/UL


(4.8-10.8)  H


 


Red Blood Count


  


  2.95 M/UL


(4.70-6.10)  L


 


Hemoglobin


  


  8.9 G/DL


(14.2-18.0)  L


 


Hematocrit


  


  26.7 %


(42.0-52.0)  L


 


Mean Corpuscular Volume  90 FL (80-99)  


 


Mean Corpuscular Hemoglobin


  


  30.0 PG


(27.0-31.0)


 


Mean Corpuscular Hemoglobin


Concent 


  33.2 G/DL


(32.0-36.0)


 


Red Cell Distribution Width


  


  11.9 %


(11.6-14.8)


 


Platelet Count


  


  472 K/UL


(150-450)  H


 


Mean Platelet Volume


  


  5.7 FL


(6.5-10.1)  L


 


Neutrophils (%) (Auto)


  


  79.0 %


(45.0-75.0)  H


 


Lymphocytes (%) (Auto)


  


  11.7 %


(20.0-45.0)  L


 


Monocytes (%) (Auto)


  


  6.7 %


(1.0-10.0)


 


Eosinophils (%) (Auto)


  


  0.7 %


(0.0-3.0)


 


Basophils (%) (Auto)


  


  1.9 %


(0.0-2.0)


 


Sodium Level


  


  140 MMOL/L


(136-145)


 


Potassium Level


  


  3.4 MMOL/L


(3.5-5.1)  L


 


Chloride Level


  


  104 MMOL/L


()


 


Carbon Dioxide Level


  


  28 MMOL/L


(21-32)


 


Anion Gap


  


  8 mmol/L


(5-15)


 


Blood Urea Nitrogen


  


  8 mg/dL (7-18)


 


 


Creatinine


  


  1.0 MG/DL


(0.55-1.30)


 


Estimat Glomerular Filtration


Rate 


  > 60 mL/min


(>60)


 


Glucose Level


  


  107 MG/DL


()  H


 


Calcium Level


  


  8.4 MG/DL


(8.5-10.1)  L











Current Medications








 Medications


  (Trade)  Dose


 Ordered  Sig/Shae


 Route


 PRN Reason  Start Time


 Stop Time Status Last Admin


Dose Admin


 


 Acetaminophen


  (Tylenol)  650 mg  Q4H  PRN


 ORAL


 Mild Pain/Temp > 100.5  12/27/18 20:45


 1/26/19 20:44  1/2/19 13:03


 


 


 Al Hydroxide/Mg


 Hydroxide


  (Mylanta II)  30 ml  Q6H  PRN


 ORAL


 dyspepsia  12/26/18 18:45


 1/25/19 18:44   


 


 


 Dextrose


  (Dextrose 50%)  25 ml  Q30M  PRN


 IV


 Hypoglycemia  12/29/18 16:15


 1/28/19 16:14   


 


 


 Dextrose


  (Dextrose 50%)  50 ml  Q30M  PRN


 IV


 Hypoglycemia  12/29/18 16:15


 1/28/19 16:14   


 


 


 Diphenhydramine


 HCl


  (Benadryl)  12.5 mg  Q6H  PRN


 IVP


 Itching/Pruritis  12/26/18 21:45


 1/25/19 21:44   


 


 


 Docusate Sodium


  (Colace)  100 mg  EVERY 12  HOURS


 ORAL


   12/26/18 21:00


 1/25/19 20:59  1/4/19 08:19


 


 


 Famotidine


  (Pepcid)  40 mg  DAILY


 ORAL


   12/27/18 09:00


 1/26/19 08:59  1/4/19 08:19


 


 


 Hydralazine HCl


  (Apresoline)  10 mg  Q6H  PRN


 ORAL


 hypertension  12/26/18 20:00


 1/25/19 19:59   


 


 


 Ibuprofen


  (Motrin)  600 mg  THREE TIMES A DAY  PRN


 ORAL


 Fever/Headache/Mild Pain  12/29/18 17:30


 1/28/19 17:29  1/3/19 13:32


 


 


 Insulin Aspart


  (NovoLOG)    BEFORE MEALS AND  HS


 SUBQ


   12/29/18 16:30


 1/28/19 16:29  1/3/19 20:48


 


 


 Insulin Detemir


  (Levemir)  18 units  QHS


 SUBQ


   12/31/18 21:00


 1/27/19 20:59  1/3/19 20:47


 


 


 Lisinopril


  (Prinivil)  20 mg  DAILY


 ORAL


   12/27/18 09:00


 1/26/19 08:59  1/4/19 08:19


 


 


 Magnesium


 Hydroxide


  (Mom)  30 ml  BIDPRN  PRN


 ORAL


 Constipation  12/26/18 21:45


 1/25/19 21:44   


 


 


 Ondansetron HCl


  (Zofran)  4 mg  Q6H  PRN


 IVP


 Nausea & Vomiting  12/26/18 18:45


 1/25/19 18:44   


 


 


 Sodium Chloride  1,000 ml @ 


 125 mls/hr  Q8H


 IV


   12/28/18 16:44


 1/27/19 16:43  1/4/19 08:18


 


 


 Vancomycin HCl


  (Vanco rx to


 dose)  1 ea  DAILY  PRN


 MISC


 Per rx protocol  12/26/18 20:00


 1/25/19 19:59   


 


 


 Vancomycin HCl/


 Dextrose  250 ml @ 


 167 mls/hr  Q12H


 IVPB


   1/1/19 18:30


 1/6/19 18:29  1/4/19 07:25


 

















Shekhar Vigil MD Jan 4, 2019 08:51

## 2019-01-04 NOTE — NUR
NURSE NOTES:

RN received d/c order from Dr. Kilpatrick, confirmed with Dr. Shah if he wanted to suture 
the patient's leg prior to d/c since pt will be getting d/sejal today. Per Dr. Fox, "No, 
d/c okay, I don't have any other plans for patient." Norco prescription and glucometer 
prescription received from Dr. Kilpatrick, RN made copy and attached both copy and original to 
pt's chart. RN called pharmacy across the street to refill med for discharge.

## 2019-01-04 NOTE — NUR
NURSE NOTES:

Pt received from [facility] alert and oriented x4, primarily [place]-speaking. No complaints 
or s/s of SOB or n/v. Pt complaints of 2/10 pain in right leg but stated that he's "okay for 
now and does not need pain medication". RN stated to pt that if his pain becomes higher to 
please call if he wants his pain medication, pt verbalized understanding. IV site 
asymptomatic and patent, running to prescribed IV fluids. Dressing dry and intact, will 
change dressing later, per Dr. Fox's orders.

## 2019-01-04 NOTE — DISCHARGE SUMMARY
Discharge Summary


Hospital Course


Date of Admission


Dec 26, 2018 at 15:05


Date of Discharge


1/4/19


Admitting Diagnosis


SEPSIS,RIGHT LEG ABSCESS


HPI


Libia Corrigan is a 43 year old male who was admitted on Dec 26, 2018 at 

15:05 for Sepsis,Right Leg Abscess,Cellulitis


43 year old male with h/o DM presents with complaints of worsening right calf 

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 Patient did not do much about it, 

continued to work however mentioned worsening pain over the day. States he had 

a cut from the fall and that it was increasingly painful and difficult to walk. 

Since then he has been having worsening swelling and pain which then led to 

subjective fevers and chills. Denies any n/v/cp/sob. CT done in the ED reviewed 

and showing concerns for infected hematoma, Surgery consulted for evaluation. s/

p I&D 12/26/18 on admit. repeat ct femur noted, no plans for repeat I&D per gen 

surg. wound cx grew MRSA. ID was consulted. Per gen surg and ID patient stable 

for dc with bactrim for 10 more days. 


dc home with HH wound care


patient doing well, denies any complaints, states pain is well controlled, 

denies fevers/chills/nausea/vomiting


also noted to be diabetic. hgba1c was 12.3, will dc home with metformin and DM 

supplies.  patient to f/u with PCP and gen surg outpt. 





 Physical Exam:





General Appearance:  alert, mild distress


Lines, tubes and drains:  peripheral


HEENT:  normocephalic, atraumatic


Neck:  non-tender, normal alignment, supple, normal inspection


Respiratory/Chest:  chest wall non-tender, lungs clear, normal breath sounds, 

no respiratory distress, no accessory muscle use


Cardiovascular/Chest:  regular rhythm, no gallop/murmur, tachycardia


Abdomen:  normal bowel sounds, non tender, soft, no organomegaly, no mass


Extremities:  right leg dressing c/d/i, painful to active motion as expected


Skin Exam:  other - right calf with cellulitis/erythema, blistering.


Neurologic:  CNs II-XII grossly normal, no motor/sensory deficits, alert, 

oriented x 3, responsive, normal mood/affect


Hospital Course


(1) MRSA (methicillin resistant Staphylococcus aureus) septicemia


Assessment & Plan:  wound cx mrsa


vanco


abx per ID, appreciate recs


ICD Codes:  A41.02 - Sepsis due to Methicillin resistant Staphylococcus aureus


SNOMED:  49247139, 785709961


(2) Severe sepsis


Assessment & Plan:  wbc 25, tachy with  and temp of 102 on admit


CT done in the ED reviewed and showing concerns for infected hematoma


appreciate surgery eval


s/p I&D 12/26/18 on admit 


repeat ct femur noted, no plans for repeat I&D


discuss with surg and id today for possible dc today/tomorrow, will likely need 

HH wound care set up as well


dressings c/d/i


cont abx per ID, 10 more days of bactrim


wound cx MRSA


cont ivf


cont pain control IV morphine


ICD Codes:  A41.9 - Sepsis, unspecified organism; R65.20 - Severe sepsis 

without septic shock


SNOMED:  44128543


(3) Abscess of leg, right


Assessment & Plan:  cont abx per above


cx sent


s/p I&D


ICD Codes:  L02.415 - Cutaneous abscess of right lower limb; R65.20 - Severe 

sepsis without septic shock


SNOMED:  312683569


(4) Cellulitis


Assessment & Plan:  cont abx


monitor closely


ICD Codes:  L03.90 - Cellulitis, unspecified


SNOMED:  941191728


Qualifiers:  


   


(5) Lactic acid acidosis


Assessment & Plan:  due to severe sepsis


resolved


ICD Codes:  E87.2 - Acidosis


SNOMED:  17973216


(6) Uncontrolled diabetes mellitus


Assessment & Plan:  iss


will dc with metformin 


monitor, adjust accordingly 


accuchecks


hgba1c 12.3


ICD Codes:  E11.65 - Type 2 diabetes mellitus with hyperglycemia


SNOMED:  80872966, 334293449


Qualifiers:  


   Qualified Codes:  E11.65 - Type 2 diabetes mellitus with hyperglycemia


(7) Essential hypertension


Assessment & Plan:  hydralazine prn


start lisinopril 20mg qday


monitor closely


likely exacerbated due to pain


ICD Codes:  I10 - Essential (primary) hypertension


SNOMED:  36446369


(8) Uncontrolled hypertension


Assessment & Plan:  hydralazine prn


started acei


monitor closely


ICD Codes:  I10 - Essential (primary) hypertension


SNOMED:  57010372, 16667718


(9) Hyponatremia


Assessment & Plan:  Na 130 on admit


improving with fluids, now Na 133


fluids


should improved


likely due to dehydration


ICD Codes:  E87.1 - Hypo-osmolality and hyponatremia


SNOMED:  43366036


Status:  stable








DVT Prophylaxis:   SCD, HSQ


Code Status:            Full


I have personally reviewed all labs, medications and imaging





Hospital Classification Declaration: Based on this initial evaluation, and 

depending on the patient's clinical course, I anticipate that this patient will 

require hospitalization for [2-3 midnights for severe sepsis/right leg abscess 

likely requiring surgery and close respiratory/hemodynamic monitoring.





Disposition: Once the patient is stable to leave the hospital, I anticipate the 

patient will likely be discharged to the following environment: home with  





I spent 45 minutes on this patient's case, and 30 minutes were dedicated to 

counseling and/or care coordination. Discussed with patient/family, nursing 

staff, SW/CM, and consultants regarding clinical status, treatment course, and 

disposition planning.





Time of note may not reflect time of encounter.


------





Date of Discussion: 12/27/18 





A face-to-face discussion with the patient regarding the patient's advanced 

care planning took place during this hospitalization on the above date. The 

discussion included the explanation and discussion of advance directives and 

associated forms/documents, as well as the patient's current code status. We 

also discussed at length the patient's medical conditions (both acute and 

chronic), general prognosis, treatment options, and goals of care. The 

following summarizes the discussion:





Advance Care Planning/Goals of Care:


- Will attempt to fill out an AD and/or POLST with the patient prior to 

discharge, if not already completed


- Continue current evaluation and management of any acute and chronic medical 

issues


- Will continue to support the patient/family


- Will continue to discuss both short- and long-term goals of care





DPOA-HC/Surrogate Decision Maker:


None currently appointed 





Code Status:


Full Code 





AD Forms/Documents Completed:


Deferred 





A total of 31 minutes was spent on this discussion, including counseling, 

answering questions, and completing, if any, pertinent advanced care planning 

forms/documents.





Discharge Medications


New Medications:  


Metformin Hcl* (Metformin Hcl*) 500 Mg Tablet


500 MG ORAL TWICE A DAY for 30 Days, #60 TAB





Trimethoprim/Sulfamethoxazole 160/800* (Bactrim Ds Tablet*) 1 Each Tablet


1 TAB ORAL Q12H for 7 Days, #14 TAB 0 Refills





Lisinopril (Lisinopril*) 20 Mg Tablet


20 MG ORAL DAILY for 30 Days, #30 TAB











Discharge


Condition Upon Discharge:  stable


Discharge Disposition


Patient was discharged to home with  wound care


Discharge Diagnoses:  


(1) Abscess of leg, right


(2) Severe sepsis


(3) Lactic acid acidosis


(4) Uncontrolled diabetes mellitus


(5) Cellulitis


(6) Essential hypertension


(7) MRSA (methicillin resistant Staphylococcus aureus) septicemia











Celi Kilpatrick MD Jan 4, 2019 07:50

## 2019-01-04 NOTE — NUR
NURSE NOTES:

Pt discharged to home with home health in stable condition. Alert and oriented x4 with no 
s/s of pain, SOB, or n/v. RN changed dressing at bedside prior to discharge and taught pt 
how to do dressing changes and that Dr. Fox ordered that dressing changes be done 
twice a day. RN endorsed medication, discharge information, as well as glucometer use to 
girlfriend, Anahy, and the patient, both verbalized understanding. RN gave the patient's meds 
to patient - Norco, Lisinopril, Bactrim, and Metformin - and advised pt about the route, 
frequency, and mechanism of action of each med, pt verbalized understanding. Pt had phone 
and earring with him but stated that his girlfriend took home his articles of clothing and 
shoes. Girlfriend, Anahy, arrived to  patient (RN called girlfriend to inform her of 
discharge but girlfriend arrived without clothes for him - stated "can we borrow clothes for 
him to come home with?"), RN gave pt and girlfriend with scrub pants, shirt, and socks. Tele 
box removed, IV d/sejal, patient wristband removed. Pt left with DME - [facility] [employer] - 
per order.

## 2019-01-05 NOTE — GENERAL PROGRESS NOTE
Assessment/Plan


Assessment/Plan


# Leukocytosis. Secondary to underlying infection, sepsis, cellulitis. leg 

abscess s/p drainage


--> Peripheral has been ordered, results are pending 


--> Medications have been reviewed 


--> Imaging has been reviewed. CXR shows no acute disease. 


--> has been started on abx, empiric treatment 


# Anemia of chronic disease due to underlying chronic medical issues, 

multifactorial. 


--> Current Hgb decreased, have reviewed w/u


--> Monitor for stability 


# Sepsis. ID is follwoing, appreciate recs. 


--> On abx. 


--> IVF 


# Abscess of R leg. Surgery is following, appreciate recs. 


--> S/P I&D


--> abx 


--> CT done in the ED reviewed and showing concerns for infected hematoma


# Cellulitis 


# Lactic acid acidosis. Due to severe sepsis. 


# DM


# HTN. 


# DVT prophylaxis. 








GREATLY APPRECIATE CONSULTATION. 





Date and time note is entered does not necessarily reflect the time of 

encounter.





Subjective


Constitutional:  Denies: no symptoms, chills, diaphoresis, fever, malaise, 

weakness, other


Cardiovascular:  Denies: no symptoms, chest pain, edema, irregular heart rate, 

lightheadedness, palpitations, syncope, other


Respiratory:  Denies: no symptoms, cough, orthopnea, shortness of breath, SOB 

with excertion, SOB at rest, sputum, stridor, wheezing, other


Gastrointestinal/Abdominal:  Denies: no symptoms, abdomen distended, abdominal 

pain, black stools, tarry stools, blood in stool, constipated, diarrhea, 

difficulty swallowing, nausea, poor appetite, poor fluid intake, rectal bleeding

, vomiting, other


Genitourinary:  Denies: no symptoms, burning, discharge, frequency, flank pain, 

hematuria, incontinence, pain, urgency, other


Neurologic/Psychiatric:  Denies: no symptoms, anxiety, depressed, emotional 

problems, headache, numbness, paresthesia, pre-existing deficit, seizure, 

tingling, tremors, weakness, other


Endocrine:  Denies: no symptoms, excessive sweating, flushing, intolerance to 

cold, intolerance to heat, increased hunger, increased thirst, increased urine, 

unexplained weight gain, unexplained weight loss, other


Allergies:  


Coded Allergies:  


     No Known Allergies (Unverified , 12/26/18)


Subjective


12/29: Pt awake and alert. No acute events. WBC remains elevated, remains on 

abx. Afebrile. H/H stable. 


12/30: k was low, has drainage of wound, seen by surg, BS still high, being 

adjusted iss


1/1: feeling better, overall, s/o drainage of right leg abscess, monitoring 

counts, no fevers


1/2 Pt is awake and alert, resting in bed. Wbc remains elevated, improving. no 

fevers or chills.


1/3 Pt is awake and seen in the room. Wbc is 12 today, no fevers, drainage of 

right leg abscess, on abx


1/4/19: Pt is awake and resting in bed. doing better today, no events.





Objective





Last 24 Hour Vital Signs








  Date Time  Temp Pulse Resp B/P (MAP) Pulse Ox O2 Delivery O2 Flow Rate FiO2


 


1/4/19 12:00  78      


 


1/4/19 12:00 98.1 85 18 141/79 (99) 97   


 


1/4/19 09:00      Room Air  


 


1/4/19 08:19    140/71    


 


1/4/19 08:00  95      


 


1/4/19 08:00 98.1 98 18 140/71 (94) 97   


 


1/4/19 04:00  80      


 


1/4/19 04:00 98.7 93 18 132/69 (90) 96   

















Intake and Output  


 


 1/4/19 1/5/19





 19:00 07:00


 


Intake Total 720 ml 


 


Output Total 2200 ml 


 


Balance -1480 ml 


 


  


 


Intake Oral 720 ml 


 


Output Urine Total 2200 ml 


 


# Bowel Movements 1 








Laboratory Tests


1/4/19 07:00: Stool Occult Blood Negative


1/4/19 07:50: 


White Blood Count 10.9H, Red Blood Count 2.95L, Hemoglobin 8.9L, Hematocrit 

26.7L, Mean Corpuscular Volume 90, Mean Corpuscular Hemoglobin 30.0, Mean 

Corpuscular Hemoglobin Concent 33.2, Red Cell Distribution Width 11.9, Platelet 

Count 472H, Mean Platelet Volume 5.7L, Neutrophils (%) (Auto) 79.0H, 

Lymphocytes (%) (Auto) 11.7L, Monocytes (%) (Auto) 6.7, Eosinophils (%) (Auto) 

0.7, Basophils (%) (Auto) 1.9, Sodium Level 140, Potassium Level 3.4L, Chloride 

Level 104, Carbon Dioxide Level 28, Anion Gap 8, Blood Urea Nitrogen 8, 

Creatinine 1.0, Estimat Glomerular Filtration Rate > 60, Glucose Level 107H, 

Calcium Level 8.4L


Height (Feet):  5


Height (Inches):  3.00


Weight (Pounds):  171


Objective


General Appearance:  alert, no acute distress


Vitals:  Have been reviewed. 


Lines, tubes and drains:  peripheral


HEENT:  normocephalic, atraumatic


Neck:  non-tender, normal alignment, supple, normal inspection


Respiratory/Chest:  chest wall non-tender, lungs clear, normal breath sounds, 

no respiratory distress, no accessory muscle use


Cardiovascular/Chest:  regular rhythm, no gallop/murmur, tachycardia


Abdomen:  normal bowel sounds, non tender, soft, no organomegaly, no mass


Extremities:  other - right calf TTP+, painful to active motion


Skin Exam:  other - right calf with cellulitis/erythema, blistering. +++ r leg 

abscess











Quintin Yeh MD Jan 5, 2019 01:40

## 2023-01-01 NOTE — NUR
NURSE NOTES:



Received patient from [facility]. Patient on bed AOx4 with family member at bedside. Noted 
right calf wound. Will hold Heparin tonight as ordered per Dr. Fox.  IV site on the 
Right AC 18G. Patent and intact. Fall precaution d/t wound. Contact precautions. Call light 
within reach. Bed brakes engaged. 34.5

## 2024-11-13 NOTE — NUR
***DISCHARGE PLAN

DISCHARGE ORDER FOR HOME WITH HOME HEALTH NOTED

SINCE PATIENT HAS PRESUMPTIVE MCAL HE IS NOT ELIGIBLE FOR HOME HEALTH SERVICES

NURSING NEEDS TO INSTRUCT PATIENT AND FAMILY ON WOUND CARE AND HAVE HIM FOLLOW UP WITH 
[facility] FOR ANY ADDITIONAL NEEDED SERVICES

-------------------------------------------------------------------------------

Addendum: 01/04/19 at 0844 by BAL FONSECA LVN LVN

-------------------------------------------------------------------------------

IN FOLLOWING MD'S ORDERS

FAXED REFERRAL TO

[facility]

T: [phone]

F: [phone]

*FOR WOUND CARE

-------------------------------------------------------------------------------

Addendum: 01/04/19 at 0923 by BAL FONSECA LVN LVN

-------------------------------------------------------------------------------

PATIENT STATED HE HAS A PRIMARY PHYSICIAN THAT HE SEES

PROVIDED PATIENT WITH [facility] AND LOW COST CLINIC LIST JUST IN CASE HIS PMD IS UNABLE TO SEE HIM [Never] : Never